# Patient Record
Sex: FEMALE | Race: BLACK OR AFRICAN AMERICAN | NOT HISPANIC OR LATINO | Employment: UNEMPLOYED | URBAN - METROPOLITAN AREA
[De-identification: names, ages, dates, MRNs, and addresses within clinical notes are randomized per-mention and may not be internally consistent; named-entity substitution may affect disease eponyms.]

---
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
## 2022-02-14 ENCOUNTER — HISTORICAL (OUTPATIENT)
Dept: ADMINISTRATIVE | Facility: HOSPITAL | Age: 22
End: 2022-02-14

## 2022-04-10 ENCOUNTER — HISTORICAL (OUTPATIENT)
Dept: ADMINISTRATIVE | Facility: HOSPITAL | Age: 22
End: 2022-04-10
Payer: MEDICAID

## 2022-04-29 VITALS
DIASTOLIC BLOOD PRESSURE: 61 MMHG | WEIGHT: 101 LBS | BODY MASS INDEX: 17.89 KG/M2 | OXYGEN SATURATION: 98 % | SYSTOLIC BLOOD PRESSURE: 90 MMHG | HEIGHT: 63 IN

## 2022-05-02 NOTE — HISTORICAL [facility] C[facility]
This is a historical note converted from [employer]. Formatting and pictures may have been removed.  Please reference [employer] for original formatting and attached multimedia. Indication for Surgery  17 y/o  with an incomplete  at 8-9wga  Preoperative Diagnosis  Incomplete   Postoperative Diagnosis  Incomplete   Operation  Suction D&C with ultrasound guidance  Surgeon(s)  Bryson Tay MD  ?  Staff  Linda Landa MD - Staff  Assistant  Betina Vale MD - PGY3  Anesthesia  General endotracheal  Estimated Blood Loss  30mL  Urine Output  30mL  ?  IVF  600mL  Findings  Exam under anesthesia revealed a 6 week size anteverted uterus, thickened endometrial stripe on ultrasound consistent with retained products of conception. Uterine contents were consistent with products of conception.  Specimen(s)  Products of conception  Complications  Non  Technique  The risks, benefits and alternatives of this procedure were discussed with the patient. The risks discussed include but are not limited to bleeding, infection, pain, need for further surgery, injury to viscera. The patient understood and all questions were answered. Informed consent was signed.  The patient was taken to the OR with IVF running where general anesthesia was administered and found to be adequate. The patient was placed in the dorsal lithotomy position with Yon stirrups and she was prepped and draped in the normal sterile fashion. Exam revealed the above findings. A weighted speculum was placed in the vagina and a right angle retractor was placed anteriorly to visualize the cervix. The anterior lip of the cervix was grasped with a single tooth tenaculum.  A number 8 suction curette was then advanced to the fundus under ultrasound guidance. This was withdrawn with a rotating manner and the products of conception removed. A thin endometrial stripe was noted.?Sharp curettage was performed in all quadrants until a gritty feeling was  noted throughout the uterus. A second pass was performed and minimal clot/products were noted. Hemostasis was noted.? The bladder was drained with a red rubber catheter. All instruments were removed.  The patient tolerated the procedure well.  Dr. Landa was present throughout.   EUA: cervix dilated to 2cm.? Blood at os.  Indication:? 17 yo F  with 8 wk gestation, presented to ED 2 days ago at [facility] with incomplete , report of clots at cervix/pain, sent home.? Had appointment with our outpatient clinic today and upon my exam had not completed passage of products.? Stat US showed retained POC, therefore surgical intervention was offered.? Medical management was discussed, however due to risk?of infection with >2 days of dilated cervix, surgical management was preferred.  Informed consent was obtained.  I was personally present throughout, and performed bedside US.? Curettings were not aggressive, however did show evacuation of all POC.? The patient had minimal EBL in surgery and CBC was checked prior to case which showed mild anemia, no leukocytosis.

## 2022-05-02 NOTE — HISTORICAL [facility] C[facility]
This is a historical note converted from [employer]. Formatting and pictures may have been removed.  Please reference [employer] for original formatting and attached multimedia. Chief Complaint  pre-op H&P - incomplete AB  History of Present Illness  19 y/o  with incomplete AB at approximately 8-9 wga. Was seen 19 at [facility] with complaints of ongoing vaginal bleeding. US notable for?intrauterine gestation at 8w0d with no cardiac activity.?Was discharged as?threatened AB, but has continued to bleed and pass tissue. Is having ongoing cramping/bleeding today. No other complaints  Review of Systems  Constitutional_denies weight changes; denies fever/chills; denies nausea/vomiting  Respiratory_denies difficulty breathing  Cardiovascular_denies chest pain  Gastrointestinal_denies abdominal pain; denies changes in bowel habits  Genitourinary_denies vaginal bleeding; denies vaginal discharge  Hema/Lymph_denies any bleeding  Musculoskeletal_denies muscle aches or pain  Neurologic_denies headaches  All Other ROS_negative?except HPI  Physical Exam  T:?36.9? ?C (Oral)? HR:?88(Peripheral)? RR:?20? BP:?93/61? SpO2:?99%? WT:?45.8?kg? BMI:?18.35?  ?   General_Well nourished, AAOX3, NAD  Neck_Normal range of motion, no obvious masses  Respiratory_Effort normal with no accessory muscle usage, lungs CTAB  Cardiovascular_RRR, S1/2 present with no added sounds  Gastrointestinal_Abdomen soft/non-tender, no organomegaly noted  Genitourinary_cervix 1-2cm dilated, with blood/products noted at os  Musculoskeletal_No swelling or edema of limbs  Neurologic_AAOx3, behavior normal, affect normal  Assessment/Plan  Orders:  Apply SCDs, 19 16:31:00 CDT, Stop date 19 16:31:00 CDT  MD to Nurse, 19 16:31:00 CDT, Umair surgical site appropriately  MD to Nurse, 19 16:31:00 CDT, Pre-Op per Anesthesia  Prep and Clip, 19 16:31:00 CDT, Clip/Prep for surgical case  Surgical Care Quality Measures, 19 16:31:00 CDT, Stop  date 19 16:31:00 CDT  Type and Crossmatch 1st order-auto, 19 16:26:00 CDT, Stat collect, Blood, Lab Collect, Packed RBC, Surgery, 2, 19, Order for future visit, 19 16:26:00 CDT  Vital Signs, 19 16:31:00 CDT, Once, Stop date 19 16:31:00 CDT, Routine  Void on Call, 19 16:31:00 CDT  17 y/o  with incomplete , for suction dilation and curettage  ?  -Counseling:?Alternatives to this planned procedure were explained to the patient including expectant, medical and other surgical management. This procedure and its risks, reasons, benefits and complications (including injury to bowel, bladder, major blood vessel, ureter, bleeding, possibility of transfusion, infection, scarring, dyspareunia, erosion, further surgery, failure of the procedure were reviewed in detail. Additional risks specific to this patient and procedure include injury to visceral and vascular structures. Patient counselled on risk of procedure not relieving her pelvic pain. Patient counseled on risk of blood transfusion including but not limited to allergic reaction, transmission of?HIV, Hep C 1:2million, transmission Hep B 1:250,000  -All questions answered, patient elects to proceed. All questions answered regarding blood transfusion and patient is OK to proceed if needed with this  -patient is 17 y/o and is accompanied by a family friend who she is currently living with  -To OR for suction D&C with ultrasound guidance   Problem List/Past Medical History  Ongoing  No qualifying data  Historical  Group B streptococcus  Pregnant  Pregnant  Procedure/Surgical History  Extraction of Products of Conception, Other, Via Natural or Artificial Opening (2019)  Repair Perineum Muscle, Open Approach (2019)  eye surgery   Medications  Inpatient  No active inpatient medications  Home  ferrous sulfate 325 mg (65 mg elemental iron) oral tablet, 325 mg= 1 tab(s), Oral, Daily  Keflex 500 mg oral capsule,  500 mg= 1 cap(s), Oral, q12hr  Allergies  No Known Medication Allergies  Social History  Abuse/Neglect  No, 2019  No, 2019  No, 2019  No, 2019  Alcohol  Never, 2016  Employment/School  Student, 2019  Home/Environment  Lives with Children, guardian. Living situation: Home/Independent., 2019  Nutrition/Health  Regular, 2019  Sexual  Sexually active: Yes., 2019  Spiritual/Cultural  None, 2019  Tobacco  Never (less than 100 in lifetime), N/A, 2019  Never smoker, 11/10/2016  Family History  Multiple sclerosis: Mother.  Lab Results  CBC and type and screen pending  Diagnostic Results  Radiology Report  US OB Transvaginal, US OB 1st Trimester  ?  REASON FOR STUDY: Incomplete , check for retained products of  conception  ?  LMP: 2019  ?  TECHNIQUE: Transabdominal and endovaginal ultrasound of the pelvis.?  ?  COMPARISON: Abdominal ultrasound dated 2019?  ?  FINDINGS:?  ?  The uterus measures 8.8 x 7.2 cm in size. There is no longer a  intrauterine gestation identified. The endometrium is 1.5 cm in  thickness. There are no vascular retained products of conception  identified.  ?  The right ovary measures 3.6 x 2.8 x 2.1 cm with a volume of 10.6 mL.  The left ovary measures 3.4 x 2.1 x 1.8 cm a volume of 7.2 mL. There  are no adnexal masses identified. There is no free fluid.  ?  ?  IMPRESSION:?  No evidence of retained products of conception.  ?  ?  Signature Line  Electronically Signed By: Promise Meraz MD  Date/Time Signed: 2019 15:51  ?      2 units type and crossed and CBC stat ordered   I personally saw her in the office and examined her.? I sent her for US and I see notable complex tissue within endometrium, 1.6cm heterogeneous.  Patient had 1.5cm dilated cervix, clot at cervix and small products that I removed with ring forceps.? Abd soft, NT;??Uterus anteverted,?8 weeks size, tender to palpation.  ?  ?Given 2 days of  incomplete AB, persistent bleeding, pain, I recommend D&C.? I have explained this to her, as well as option of cytotec - however I am concerned she is at increased risk of infection with further observation of this.? She agrees to proceed with D&C.? There is an older female with her, patient states this is who she lives with, but not relative.? She states her mother is in a nursing home and her father is not around.? Pt states father of baby is not around, not involved.  She has consented verbally to me.? Dr. Gonzalez discusssed and signed consents.? risks of bleeding, needing transfusion, uterine perforation and need for abdominal incision were discussed by me.? I explained we will order gonorrhea/chlamydia testing as well.?  ?

## 2022-05-02 NOTE — HISTORICAL [facility] C[facility]
This is a historical note converted from Derek. Formatting and pictures may have been removed.  Please reference Derek for original formatting and attached multimedia. Chief Complaint  post op D/C  History of Present Illness  19 y/o ?with hx incomplete  s/p suction D&C on 2019. Procedure overall uncomplicated.?Minimal cramping since procedure.?Denies vagina?bleeding. Presents today with her guardian. Desires contraception but unsure of?method. Denies sexual acitivity since procedure. Remains complaint?with valcyclovir and cephalexin. Feels safe at home .States her oldest child is doing well. Remains in school.  ?   Intraop Findings: Exam under anesthesia revealed a 6 week size anteverted uterus, thickened endometrial stripe on ultrasound consistent with retained products of conception. Uterine contents were consistent with products of conception. [1] ?  ?  Uterine contents:  - Decidua, hypersecretory endometrium and blood clot. No chorionic villi are identified.  ?Note: See case [id number].  ?  Review of Systems  Constitutional - denies fever, chills, unintended?weight loss  Cardiovascular - denies chest pain, palpitations  Respiratory - denies shortness of breath, wheezing  Gastrointestinal - denies abdominal pain, acid reflux, nausea, vomiting, diarrhea, constipation, blood in the stool  Genitourinary - denies vaginal discharge, vulvovaginal itching, dyspareunia, pelvic pain, dysuria, hematuria  Musculoskeletal - denies back pain  Neurologic - denies numbness or tingling in her extremities  Physical Exam  Vitals & Measurements  T:?36.6? ?C (Oral)? HR:?68(Peripheral)? RR:?18? BP:?102/66? SpO2:?98%?  HT:?156?cm? WT:?46.6?kg? BMI:?19.15?  General Appearance: Alert, cooperative, no distress, well appearring  Lungs: Clear to auscultation bilaterally, respirations unlabored  Heart: Regular rate and rhythm, S1 and S2 normal, no murmur, rub or gallop  Abdomen: Soft, thin, non-tender, bowel sounds  active all four quadrants, no masses, no organomegaly  Genitalia:  - External genitalia: Normal without lesions  - Urethral meatus: Normal  - Bladder: No suprapubic tenderness  - Vaginal/pelvic support: Normal, moist vaginal mucosa without lesions. No blood with diffuse thick white clumps of vaginal discharge c/w candidiasis  - Cervix: No CMT, no lesions  - Uterus: small ,mobile, non-tender  - Adnexa/parametria: No fullness or masses  - Anus/perineum: Intact without lesions or hemorrhoids  Extremities: Extremities normal, atraumatic, no cyanosis or edema  Skin: Skin turgor normal, no rashes or lesions.?  ?  Assessment/Plan  1.?Incomplete ?O03.4  Pathology reviewed.  Ibuprofen prn cramping.  Contraception Counseling: Patient counseled on all risks/benefits/alternatives of contraceptive options including LARCS/IUD, Implant, Injections, Combined Oral Contraceptive Pills, Progesterone only pills, barrier methods, vasectomy, natural family planning and abstinence.  Wet prep collected.  Diflucan sent to patients pharmacy. #1 refills  Ordered:  medroxyPROGESTERone, 150 mg, form: Injection, IM, Once, first dose 19 9:00:00 CST, stop date 19 9:00:00 CST, Routine, Months +3, Future Order  medroxyPROGESTERone, 150 mg, form: Injection, IM, Once, first dose 19 9:00:00 CDT, stop date 19 9:00:00 CDT, Routine, Months 0  medroxyPROGESTERone, 150 mg, form: Injection, IM, Once, first dose 20 9:00:00 CDT, stop date 20 9:00:00 CDT, Routine, Months +9, Future Order  medroxyPROGESTERone, 150 mg, form: Injection, IM, Once, first dose 20 9:00:00 CDT, stop date 20 9:00:00 CDT, Routine, Months +6, Future Order  CBC w/ Auto Diff, Routine collect, 19 8:50:00 CDT, Blood, Order for future visit, Stop date 19 8:50:00 CDT, Lab Collect, Incomplete , 19 8:50:00 CDT  Clinic Follow up, *Est. 10/13/19 3:00:00 CDT, Order for future visit, Incomplete , [facility]  ?  Orders:  Wet Prep Smear, Stat collect, Vaginal, Order for future visit, 19 8:50:00 CDT, Stop date 19 8:50:00 CDT, Nurse collect, Vaginal discharge, 19 8:50:00 CDT   OB History  Pregnancy History???(1,0,0,1)?? ??  Pregnancy # 1  Baby 1?????????????Outcome Date:?2019????? Outcome:?Live Birth  ???Outcome or Result:?Vaginal, Forcep Assist  ???Gender:?Male????????Gest Age:?38 weeks 3 days ??????Wt:??3210 g  ???Hospital:?[facility]????????Virgilio Labor:?--  ???Marla Name:?--?????Babys Father:?--  ???Maternal Complications:?Group B streptococcus  ??? Complications:?Facial bruising  ?  Pregnancy # 2  Baby 1?????????????Outcome Date:?2019????? Outcome:?Fetal Death  ???Outcome or Result:?Vaginal  ???Gender:?--????????Gest Age:?8 weeks ??????Wt:?--  ???Hospital:?--????????Virgilio Labor:?--  ???Marla Name:?--?????Babys Father:?--  Problem List/Past Medical History  Ongoing  No qualifying data  Historical  Group B streptococcus  Pregnant  Pregnant  Procedure/Surgical History  Dilation & Curettage (2019)  Extraction of Products of Conception, Retained, Via Natural or Artificial Opening (2019)  Treatment of incomplete , any trimester, completed surgically (2019)  Extraction of Products of Conception, Other, Via Natural or Artificial Opening (2019)  Repair Perineum Muscle, Open Approach (2019)  eye surgery   Medications  acetaminophen-hydrocodone 325 mg-5 mg oral tablet, 1 tab(s), Oral, QID,? ?Not taking  azithromycin 500 mg oral tablet, 1000 mg= 2 tab(s), Oral, Once,? ?Not Taking, Completed Rx  cephalexin 500 mg oral capsule, 500 mg= 1 cap(s), Oral, BID  ferrous sulfate 325 mg (65 mg elemental iron) oral tablet, 325 mg= 1 tab(s), Oral, Daily,? ?Not taking  ibuprofen 600 mg oral tablet, 600 mg= 1 tab(s), Oral, q8hr,? ?Not taking  metroNIDAZOLE 250 mg oral tablet, 500 mg= 2 tab(s), Oral, BID,? ?Investigating  metronidazole 500 mg oral  tablet, 500 mg= 1 tab(s), Oral, BID,? ?Investigating  nitrofurantoin macrocrystals-monohydrate 100 mg oral capsule, 100 mg= 1 cap(s), Oral, BID,? ?Not Taking, Completed Rx  Toradol 10 mg oral tablet, 10 mg= 1 tab(s), Oral, QID,? ?Not taking  valacyclovir 500 mg oral tablet, 500 mg= 1 tab(s), Oral, Daily  Allergies  No Known Medication Allergies      Reviewed the patients medical history, residents findings on physical exam, and the diagnosis with treatment plan. Care provided was reasonable and necessary.

## 2022-06-01 VITALS
HEART RATE: 73 BPM | OXYGEN SATURATION: 99 % | TEMPERATURE: 98 F | HEIGHT: 62 IN | DIASTOLIC BLOOD PRESSURE: 79 MMHG | RESPIRATION RATE: 18 BRPM | BODY MASS INDEX: 17.85 KG/M2 | SYSTOLIC BLOOD PRESSURE: 117 MMHG | WEIGHT: 97 LBS

## 2022-06-01 PROCEDURE — 99900041 HC LEFT WITHOUT BEING SEEN- EMERGENCY

## 2022-06-02 ENCOUNTER — HOSPITAL ENCOUNTER (EMERGENCY)
Facility: HOSPITAL | Age: 22
Discharge: LEFT BEFORE DISCHARGE | End: 2022-06-02
Attending: INTERNAL MEDICINE
Payer: MEDICAID

## 2022-06-02 LAB
AMPHET UR QL SCN: NEGATIVE
APPEARANCE UR: CLEAR
B-HCG SERPL QL: POSITIVE
BACTERIA #/AREA URNS AUTO: ABNORMAL /HPF
BARBITURATE SCN PRESENT UR: NEGATIVE
BENZODIAZ UR QL SCN: NEGATIVE
BILIRUB UR QL STRIP.AUTO: NEGATIVE MG/DL
CANNABINOIDS UR QL SCN: POSITIVE
COCAINE UR QL SCN: NEGATIVE
COLOR UR AUTO: ABNORMAL
FENTANYL UR QL SCN: NEGATIVE
GLUCOSE UR QL STRIP.AUTO: NEGATIVE MG/DL
KETONES UR QL STRIP.AUTO: NEGATIVE MG/DL
LEUKOCYTE ESTERASE UR QL STRIP.AUTO: NEGATIVE UNIT/L
MDMA UR QL SCN: NEGATIVE
MUCOUS THREADS URNS QL MICRO: ABNORMAL /LPF
NITRITE UR QL STRIP.AUTO: NEGATIVE
OPIATES UR QL SCN: NEGATIVE
PCP UR QL: NEGATIVE
PH UR STRIP.AUTO: 6 [PH]
PH UR: 6 [PH] (ref 3–11)
PROT UR QL STRIP.AUTO: 30 MG/DL
RBC #/AREA URNS AUTO: ABNORMAL /HPF
RBC UR QL AUTO: NEGATIVE UNIT/L
SP GR UR STRIP.AUTO: >=1.03
SPECIFIC GRAVITY, URINE AUTO (.000) (OHS): >=1.03 (ref 1–1.03)
SQUAMOUS #/AREA URNS AUTO: ABNORMAL /LPF
UROBILINOGEN UR STRIP-ACNC: 1 MG/DL
WBC #/AREA URNS AUTO: ABNORMAL /HPF

## 2022-06-02 PROCEDURE — 81001 URINALYSIS AUTO W/SCOPE: CPT | Mod: 59 | Performed by: INTERNAL MEDICINE

## 2022-06-02 PROCEDURE — 81025 URINE PREGNANCY TEST: CPT | Performed by: INTERNAL MEDICINE

## 2022-06-02 PROCEDURE — 80307 DRUG TEST PRSMV CHEM ANLYZR: CPT | Performed by: INTERNAL MEDICINE

## 2022-06-02 NOTE — ED NOTES
"Assumed care from zuri. Pt arrives to ED ambulatory with reports of abdominal pain in pregnancy. Pt states she is 11 weeks gestation, MARIA EUGENIA 22, . Pt states she does not see and OB until next week. Pt reports bilateral lower abdominal cramping and "tightness" on left lower abdomen. NAD is noted at time of assessment. Will continue to monitor.   "

## 2022-06-02 NOTE — ED NOTES
Patient walked out of room 5 and stated she was leaving. Verbalized risks with pt and she stated she was leaving and walked out. This information verbalized to Dr. Amor.

## 2022-06-12 ENCOUNTER — HOSPITAL ENCOUNTER (EMERGENCY)
Facility: HOSPITAL | Age: 22
Discharge: HOME | End: 2022-06-12
Attending: FAMILY MEDICINE
Payer: MEDICAID

## 2022-06-12 VITALS
BODY MASS INDEX: 18.03 KG/M2 | WEIGHT: 98 LBS | RESPIRATION RATE: 16 BRPM | SYSTOLIC BLOOD PRESSURE: 102 MMHG | DIASTOLIC BLOOD PRESSURE: 69 MMHG | TEMPERATURE: 98 F | HEIGHT: 62 IN | HEART RATE: 72 BPM | OXYGEN SATURATION: 100 %

## 2022-06-12 DIAGNOSIS — E86.0 DEHYDRATION: ICD-10-CM

## 2022-06-12 DIAGNOSIS — R42 DIZZINESS: Primary | ICD-10-CM

## 2022-06-12 LAB
ABS NEUT CALC (OHS): 1.01 X10(3)/MCL (ref 2.1–9.2)
ALBUMIN SERPL-MCNC: 3.9 GM/DL (ref 3.5–5)
ALBUMIN/GLOB SERPL: 1.2 RATIO (ref 1.1–2)
ALP SERPL-CCNC: 59 UNIT/L (ref 40–150)
ALT SERPL-CCNC: <5 UNIT/L (ref 0–55)
APPEARANCE UR: CLEAR
AST SERPL-CCNC: 16 UNIT/L (ref 5–34)
B-HCG SERPL QL: POSITIVE
BILIRUB UR QL STRIP.AUTO: NEGATIVE MG/DL
BILIRUBIN DIRECT+TOT PNL SERPL-MCNC: 1 MG/DL
BUN SERPL-MCNC: 5 MG/DL (ref 7–18.7)
CALCIUM SERPL-MCNC: 9.7 MG/DL (ref 8.4–10.2)
CHLORIDE SERPL-SCNC: 104 MMOL/L (ref 98–107)
CO2 SERPL-SCNC: 24 MMOL/L (ref 22–29)
COLOR UR AUTO: YELLOW
CREAT SERPL-MCNC: 0.71 MG/DL (ref 0.55–1.02)
EOSINOPHIL NFR BLD MANUAL: 0.04 X10(3)/MCL (ref 0–0.9)
EOSINOPHIL NFR BLD MANUAL: 1 % (ref 0–8)
ERYTHROCYTE [DISTWIDTH] IN BLOOD BY AUTOMATED COUNT: 15.4 % (ref 11.5–17)
GLOBULIN SER-MCNC: 3.3 GM/DL (ref 2.4–3.5)
GLUCOSE SERPL-MCNC: 78 MG/DL (ref 74–100)
GLUCOSE UR QL STRIP.AUTO: NEGATIVE MG/DL
HCT VFR BLD AUTO: 40.4 % (ref 37–47)
HGB BLD-MCNC: 12.4 GM/DL (ref 12–16)
IMM GRANULOCYTES # BLD AUTO: 0 X10(3)/MCL (ref 0–0.02)
IMM GRANULOCYTES NFR BLD AUTO: 0 % (ref 0–0.43)
KETONES UR QL STRIP.AUTO: NEGATIVE MG/DL
LEUKOCYTE ESTERASE UR QL STRIP.AUTO: NEGATIVE UNIT/L
LYMPHOCYTES NFR BLD MANUAL: 2.21 X10(3)/MCL
LYMPHOCYTES NFR BLD MANUAL: 63 % (ref 13–40)
MCH RBC QN AUTO: 24.3 PG (ref 27–31)
MCHC RBC AUTO-ENTMCNC: 30.7 MG/DL (ref 33–36)
MCV RBC AUTO: 79.2 FL (ref 80–94)
MONOCYTES NFR BLD MANUAL: 0.24 X10(3)/MCL (ref 0.1–1.3)
MONOCYTES NFR BLD MANUAL: 7 % (ref 2–11)
NEUTROPHILS NFR BLD MANUAL: 29 % (ref 47–80)
NITRITE UR QL STRIP.AUTO: NEGATIVE
PH UR STRIP.AUTO: 7.5 [PH]
PLATELET # BLD AUTO: 273 X10(3)/MCL (ref 130–400)
PLATELET # BLD EST: ADEQUATE 10*3/UL
PMV BLD AUTO: 9.8 FL (ref 9.4–12.4)
POTASSIUM SERPL-SCNC: 4 MMOL/L (ref 3.5–5.1)
PROT SERPL-MCNC: 7.2 GM/DL (ref 6.4–8.3)
PROT UR QL STRIP.AUTO: NEGATIVE MG/DL
RBC # BLD AUTO: 5.1 X10(6)/MCL (ref 4.2–5.4)
RBC MORPH BLD: NORMAL
RBC UR QL AUTO: NEGATIVE UNIT/L
SODIUM SERPL-SCNC: 137 MMOL/L (ref 136–145)
SP GR UR STRIP.AUTO: 1.02
UROBILINOGEN UR STRIP-ACNC: 1 MG/DL
WBC # SPEC AUTO: 3.5 X10(3)/MCL (ref 4.5–11.5)

## 2022-06-12 PROCEDURE — 96360 HYDRATION IV INFUSION INIT: CPT

## 2022-06-12 PROCEDURE — 99284 EMERGENCY DEPT VISIT MOD MDM: CPT | Mod: 25

## 2022-06-12 PROCEDURE — 81025 URINE PREGNANCY TEST: CPT | Performed by: FAMILY MEDICINE

## 2022-06-12 PROCEDURE — 36415 COLL VENOUS BLD VENIPUNCTURE: CPT | Performed by: FAMILY MEDICINE

## 2022-06-12 PROCEDURE — 63600175 PHARM REV CODE 636 W HCPCS: Performed by: FAMILY MEDICINE

## 2022-06-12 PROCEDURE — 81003 URINALYSIS AUTO W/O SCOPE: CPT | Performed by: FAMILY MEDICINE

## 2022-06-12 PROCEDURE — 85025 COMPLETE CBC W/AUTO DIFF WBC: CPT | Performed by: FAMILY MEDICINE

## 2022-06-12 PROCEDURE — 80053 COMPREHEN METABOLIC PANEL: CPT | Performed by: FAMILY MEDICINE

## 2022-06-12 RX ADMIN — SODIUM CHLORIDE, POTASSIUM CHLORIDE, SODIUM LACTATE AND CALCIUM CHLORIDE 1000 ML: 600; 310; 30; 20 INJECTION, SOLUTION INTRAVENOUS at 12:06

## 2022-06-12 NOTE — ED PROVIDER NOTES
Encounter Date: 2022       History     Chief Complaint   Patient presents with    Dizziness     Pt states is 12 weeks and 4 days pregnant, reports dizziness and weakness for the last few days.      20 y/o female presents to the ER with complaints of weakness and lightheadedness, worse when going from sitting to standing.  Patient is a  with IUP of 12wga.  OB appointment this week.  No fever or chills.  No nausea or vomiting.  No dysuria or hematuria.        Review of patient's allergies indicates:  No Known Allergies  History reviewed. No pertinent past medical history.  Past Surgical History:   Procedure Laterality Date    DILATION AND CURETTAGE OF UTERUS      EYE SURGERY Right      No family history on file.  Social History     Tobacco Use    Smoking status: Never Smoker    Smokeless tobacco: Never Used   Substance Use Topics    Alcohol use: Never    Drug use: Never     Review of Systems   Constitutional: Negative for chills, fatigue and fever.   HENT: Negative for ear pain, rhinorrhea and sore throat.    Eyes: Negative for photophobia and pain.   Respiratory: Negative for cough, shortness of breath and wheezing.    Cardiovascular: Negative for chest pain.   Gastrointestinal: Negative for abdominal pain, diarrhea, nausea and vomiting.   Genitourinary: Negative for dysuria.   Neurological: Positive for dizziness and weakness. Negative for headaches.        Presyncope   All other systems reviewed and are negative.      Physical Exam     Initial Vitals [22 1114]   BP Pulse Resp Temp SpO2   (!) 132/90 65 16 97.9 °F (36.6 °C) 100 %      MAP       --         Physical Exam    Nursing note and vitals reviewed.  Constitutional: She appears well-developed and well-nourished. No distress.   HENT:   Head: Normocephalic and atraumatic.   Eyes: Conjunctivae and EOM are normal. Pupils are equal, round, and reactive to light.   Neck: Neck supple.   Normal range of motion.  Cardiovascular: Normal rate,  regular rhythm and normal heart sounds.   Pulmonary/Chest: No respiratory distress. She has no wheezes. She has no rhonchi. She has no rales.   Abdominal: Abdomen is soft. Bowel sounds are normal. She exhibits no distension. There is no abdominal tenderness. There is no rebound and no guarding.   Musculoskeletal:         General: Normal range of motion.      Cervical back: Normal range of motion and neck supple.     Neurological: She is alert and oriented to person, place, and time.   Skin: Skin is warm and dry. Capillary refill takes less than 2 seconds. No erythema.   Psychiatric: She has a normal mood and affect. Her behavior is normal. Judgment and thought content normal.         ED Course   Procedures  Labs Reviewed   HCG QUALITATIVE URINE - Abnormal; Notable for the following components:       Result Value    Beta hCG Qualitative, Urine Positive (*)     All other components within normal limits   COMPREHENSIVE METABOLIC PANEL - Abnormal; Notable for the following components:    Blood Urea Nitrogen 5.0 (*)     All other components within normal limits   CBC WITH DIFFERENTIAL - Abnormal; Notable for the following components:    WBC 3.5 (*)     MCV 79.2 (*)     MCH 24.3 (*)     MCHC 30.7 (*)     All other components within normal limits   MANUAL DIFFERENTIAL - Abnormal; Notable for the following components:    Neut Man 29 (*)     Lymph Man 63 (*)     Abs Neut calc 1.015 (*)     Abs Lymp 2.205 (*)     All other components within normal limits   URINALYSIS   CBC W/ AUTO DIFFERENTIAL    Narrative:     The following orders were created for panel order CBC Auto Differential.  Procedure                               Abnormality         Status                     ---------                               -----------         ------                     CBC with Differential[[id number]]        Abnormal            Final result               Manual Differential[[id number]]          Abnormal            Final result                  Please view results for these tests on the individual orders.          Imaging Results    None          Medications   lactated ringers bolus 1,000 mL (1,000 mLs Intravenous New Bag 6/12/22 1243)                 ED Course as of 06/12/22 1319   Sun Jun 12, 2022   1315 Feeling much improved;  Reassurance given to patient and family.  ER precautions for any acute worsening. [MW]      ED Course User Index  [MW] Edgar Leonard MD             Clinical Impression:   Final diagnoses:  [R42] Dizziness (Primary)  [E86.0] Dehydration          ED Disposition Condition    Discharge Stable        ED Prescriptions     None        Follow-up Information     Follow up With Specialties Details Why Contact Info    [facility] - Emergency Dept Emergency Medicine  As needed, If symptoms worsen [address] [facility] [id number] [phone]    Followup with DANIELLE Leonard MD  06/12/22 1319       Edgar Leonard MD  07/06/22 2000

## 2022-06-14 LAB
CHLAMYDIA: NEGATIVE
GONOCOCCUS BY NAA: NEGATIVE
MYCOPLASMA GENITALIUM, PCR: NEGATIVE
TRICHOMONAS: NEGATIVE
URINE CULTURE, ROUTINE: NO GROWTH

## 2022-06-23 ENCOUNTER — HISTORICAL (OUTPATIENT)
Dept: ADMINISTRATIVE | Facility: HOSPITAL | Age: 22
End: 2022-06-23

## 2022-07-06 LAB
HGB ELECTROPHORESIS: NORMAL
INDIRECT COOMBS: NEGATIVE
RUBELLA IMMUNE STATUS: NORMAL

## 2022-08-02 LAB
AMPHETAMINES: NEGATIVE
BARBITURATES: NEGATIVE
BENZODIAZEPINES: NEGATIVE
CANNABINOID: POSITIVE
CHLAMYDIA: NEGATIVE
COCAINE: NEGATIVE
GONOCOCCUS BY NAA: NEGATIVE
METHAMPHETAMINE: NEGATIVE
OPIATES: NEGATIVE
PCP: NEGATIVE

## 2022-09-15 ENCOUNTER — HISTORICAL (OUTPATIENT)
Dept: ADMINISTRATIVE | Facility: HOSPITAL | Age: 22
End: 2022-09-15
Payer: MEDICAID

## 2022-09-16 ENCOUNTER — HISTORICAL (OUTPATIENT)
Dept: ADMINISTRATIVE | Facility: HOSPITAL | Age: 22
End: 2022-09-16
Payer: MEDICAID

## 2022-09-19 ENCOUNTER — HOSPITAL ENCOUNTER (EMERGENCY)
Facility: HOSPITAL | Age: 22
Discharge: HOME | End: 2022-09-19
Payer: MEDICAID

## 2022-09-19 VITALS — BODY MASS INDEX: 20.8 KG/M2 | WEIGHT: 113 LBS | HEIGHT: 62 IN

## 2022-09-19 PROBLEM — O26.899 ABDOMINAL CRAMPING AFFECTING PREGNANCY: Status: ACTIVE | Noted: 2022-09-19

## 2022-09-19 PROBLEM — R10.9 ABDOMINAL CRAMPING AFFECTING PREGNANCY: Status: ACTIVE | Noted: 2022-09-19

## 2022-09-19 LAB
APPEARANCE UR: CLEAR
BACTERIA #/AREA URNS AUTO: ABNORMAL /HPF
BILIRUB UR QL STRIP.AUTO: NEGATIVE MG/DL
COLOR UR AUTO: YELLOW
GLUCOSE UR QL STRIP.AUTO: NEGATIVE MG/DL
KETONES UR QL STRIP.AUTO: NEGATIVE MG/DL
LEUKOCYTE ESTERASE UR QL STRIP.AUTO: ABNORMAL UNIT/L
NITRITE UR QL STRIP.AUTO: NEGATIVE
PH UR STRIP.AUTO: 7.5 [PH]
PROT UR QL STRIP.AUTO: NEGATIVE MG/DL
RBC #/AREA URNS AUTO: <5 /HPF
RBC UR QL AUTO: NEGATIVE UNIT/L
SP GR UR STRIP.AUTO: 1.01 (ref 1–1.03)
SQUAMOUS #/AREA URNS AUTO: 6 /HPF
UROBILINOGEN UR STRIP-ACNC: 1 MG/DL
WBC #/AREA URNS AUTO: 9 /HPF

## 2022-09-19 PROCEDURE — 81001 URINALYSIS AUTO W/SCOPE: CPT | Performed by: OBSTETRICS & GYNECOLOGY

## 2022-09-19 PROCEDURE — 99284 EMERGENCY DEPT VISIT MOD MDM: CPT | Mod: 25

## 2022-09-19 NOTE — ED PROVIDER NOTES
"       ALDA NOTE  [facility]     Admit Date: 2022  ALDA Physician: Cleopatra Hill  Primary OBGYN: No Attending/OB Hospitalist Group Admit    Admit Diagnosis/Chief Complaint:  cramping  Discharge Diagnosis:  dehydration 2nd trimester pregnancy    Chief Complaint   Patient presents with    Abdominal Pain     IUP, pt unsure of gestational ago states she has not been to the MD in 2 months, previously was being followed by Dr. Vasquez in [facility]:  Meena Hurtado is a 21 y.o.  at 20w4d by [place] ED ultrasound, presents complaining of cramping.  This IUP is complicated by no prenatal care this pregnancy since she moved from [place] to [place]. Reports increased vaginal discharge.    Patient denies vaginal bleeding, leakage of fluid, contractions, headache, vision changes, RUQ pain, dysuria, fever, and nausea/vomiting.  Fetal Movement: normal.    Ht 5' 2" (1.575 m)   Wt 51.3 kg (113 lb)   LMP 03/15/2022 (Approximate) Comment: MARIA EUGENIA based on 1st trimester US from Dr. Vasquez per pt  Breastfeeding No   BMI 20.67 kg/m²        General: in no apparent distress well developed and well nourished non-toxic in no respiratory distress and acyanotic alert oriented times 3 afebrile normal vitals  Cardiovascular: regular rate and rhythm no murmurs  Respiratory: unlabored  Abdominal: soft, nontender, nondistended, no abnormal masses, no epigastric pain FHT present  FH 21cm  Back: lumbar tenderness absent CVA tenderness none suprapubic tenderness absent  Extremeties no redness or tenderness in the calves or thighs no edema    SVE (PeriWATCH)  Dilation (cm): 0  Effacement (%): 0  Station: -4  Cervical Position: Mid Position  Cervical Consistency: Firm  Examined by:: Sergio Serrano in room: [facility]  Vaginal Exam Comments: normal leukorrhea on glove  Hsu Score: 1  Simplified Hsu Score: 0         EFM: FHT present 150s      Medical Decision Making:      LABS:   No " results found for this or any previous visit (from the past 24 hour(s)).    Imaging Results    None          ASSESMENT: Meena Hurtado is a 21 y.o.   at 20w4d with     Discharge Diagnosis:   Patient Active Problem List   Diagnosis    Abdominal cramping affecting pregnancy       Status:Improved    Disposition:  discharged to home    UA sent, will call with results    Patient Instructions:   - Pt was given routine pregnancy instructions including to return to triage if she had any vaginal bleeding (other than spotting for the next 48hrs), any loss of fluid like her water broke, decreased fetal movement, or contractions Q 5min lasting for 2 or more hours. Pt was also instructed to drink copious water. Patient voiced understanding of all these instructions and was subsequently discharged home. Tylenol use and maternity belt use discussed. All questions answered. Pt left ALDA with good understanding of plan.   Preeclampsia/ROM/labor/fever/decreased FM with C precautions discussed, voiced understanding     Phone number and info given for [facility] clinic to establish care SHAW Hill MD  OB/GYN Hospitalist  5:59 PM 2022

## 2022-09-19 NOTE — DISCHARGE INSTRUCTIONS
Keep Regular scheduled appointments  Return to hospital for vaginal bleeding, leaking of fluids, regular contractions, decreased fetal movement.

## 2022-09-29 ENCOUNTER — OFFICE VISIT (OUTPATIENT)
Dept: FAMILY MEDICINE | Facility: CLINIC | Age: 22
End: 2022-09-29
Payer: MEDICAID

## 2022-09-29 ENCOUNTER — HOSPITAL ENCOUNTER (OUTPATIENT)
Dept: RADIOLOGY | Facility: HOSPITAL | Age: 22
Discharge: HOME | End: 2022-09-29
Attending: OBSTETRICS & GYNECOLOGY
Payer: MEDICAID

## 2022-09-29 VITALS
HEART RATE: 92 BPM | SYSTOLIC BLOOD PRESSURE: 92 MMHG | HEIGHT: 62 IN | OXYGEN SATURATION: 100 % | WEIGHT: 113.38 LBS | RESPIRATION RATE: 18 BRPM | DIASTOLIC BLOOD PRESSURE: 56 MMHG | BODY MASS INDEX: 20.86 KG/M2 | TEMPERATURE: 98 F

## 2022-09-29 DIAGNOSIS — K21.9 GASTROESOPHAGEAL REFLUX DISEASE, UNSPECIFIED WHETHER ESOPHAGITIS PRESENT: ICD-10-CM

## 2022-09-29 DIAGNOSIS — O09.30 LATE PRENATAL CARE: Primary | ICD-10-CM

## 2022-09-29 DIAGNOSIS — Z13.31 POSITIVE DEPRESSION SCREENING: ICD-10-CM

## 2022-09-29 DIAGNOSIS — Z3A.22 22 WEEKS GESTATION OF PREGNANCY: ICD-10-CM

## 2022-09-29 DIAGNOSIS — Z34.90 PREGNANCY: ICD-10-CM

## 2022-09-29 LAB
ANION GAP SERPL CALC-SCNC: 8 MEQ/L
APPEARANCE UR: CLEAR
BACTERIA #/AREA URNS AUTO: ABNORMAL /HPF
BASOPHILS # BLD AUTO: 0.04 X10(3)/MCL (ref 0–0.2)
BASOPHILS NFR BLD AUTO: 0.9 %
BILIRUB SERPL-MCNC: NEGATIVE MG/DL
BILIRUB UR QL STRIP.AUTO: NEGATIVE MG/DL
BLOOD URINE, POC: NEGATIVE
BUN SERPL-MCNC: 6 MG/DL (ref 7–18.7)
CALCIUM SERPL-MCNC: 8.7 MG/DL (ref 8.4–10.2)
CHLORIDE SERPL-SCNC: 106 MMOL/L (ref 98–107)
CLARITY, POC UA: CLEAR
CO2 SERPL-SCNC: 25 MMOL/L (ref 22–29)
COLOR UR AUTO: ABNORMAL
COLOR, POC UA: YELLOW
CREAT SERPL-MCNC: 0.62 MG/DL (ref 0.55–1.02)
CREAT/UREA NIT SERPL: 10
EOSINOPHIL # BLD AUTO: 0.21 X10(3)/MCL (ref 0–0.9)
EOSINOPHIL NFR BLD AUTO: 4.5 %
ERYTHROCYTE [DISTWIDTH] IN BLOOD BY AUTOMATED COUNT: 13.6 % (ref 11.5–17)
GFR SERPLBLD CREATININE-BSD FMLA CKD-EPI: >60 MLS/MIN/1.73/M2
GLUCOSE SERPL-MCNC: 65 MG/DL (ref 74–100)
GLUCOSE UR QL STRIP.AUTO: NORMAL MG/DL
GLUCOSE UR QL STRIP: NEGATIVE
HBV SURFACE AG SERPL QL IA: NONREACTIVE
HCT VFR BLD AUTO: 34.1 % (ref 37–47)
HCV AB SERPL QL IA: NONREACTIVE
HGB BLD-MCNC: 10.6 GM/DL (ref 12–16)
HIV 1+2 AB+HIV1 P24 AG SERPL QL IA: NONREACTIVE
HYALINE CASTS #/AREA URNS LPF: ABNORMAL /LPF
IMM GRANULOCYTES # BLD AUTO: 0.02 X10(3)/MCL (ref 0–0.04)
IMM GRANULOCYTES NFR BLD AUTO: 0.4 %
INDIRECT COOMBS GEL: NORMAL
KETONES UR QL STRIP.AUTO: NEGATIVE MG/DL
KETONES UR QL STRIP: NEGATIVE
LEUKOCYTE ESTERASE UR QL STRIP.AUTO: NEGATIVE UNIT/L
LEUKOCYTE ESTERASE URINE, POC: NORMAL
LYMPHOCYTES # BLD AUTO: 1.56 X10(3)/MCL (ref 0.6–4.6)
LYMPHOCYTES NFR BLD AUTO: 33.3 %
MCH RBC QN AUTO: 24.9 PG (ref 27–31)
MCHC RBC AUTO-ENTMCNC: 31.1 MG/DL (ref 33–36)
MCV RBC AUTO: 80.2 FL (ref 80–94)
MONOCYTES # BLD AUTO: 0.59 X10(3)/MCL (ref 0.1–1.3)
MONOCYTES NFR BLD AUTO: 12.6 %
MUCOUS THREADS URNS QL MICRO: ABNORMAL /LPF
NEUTROPHILS # BLD AUTO: 2.3 X10(3)/MCL (ref 2.1–9.2)
NEUTROPHILS NFR BLD AUTO: 48.3 %
NITRITE UR QL STRIP.AUTO: NEGATIVE
NITRITE, POC UA: NEGATIVE
NRBC BLD AUTO-RTO: 0 %
PH UR STRIP.AUTO: 6.5 [PH]
PH, POC UA: 7
PLATELET # BLD AUTO: 233 X10(3)/MCL (ref 130–400)
PMV BLD AUTO: 10.5 FL (ref 7.4–10.4)
POTASSIUM SERPL-SCNC: 3.6 MMOL/L (ref 3.5–5.1)
PROT UR QL STRIP.AUTO: NEGATIVE MG/DL
PROTEIN, POC: NEGATIVE
RBC # BLD AUTO: 4.25 X10(6)/MCL (ref 4.2–5.4)
RBC #/AREA URNS AUTO: ABNORMAL /HPF
RBC UR QL AUTO: NEGATIVE UNIT/L
SODIUM SERPL-SCNC: 139 MMOL/L (ref 136–145)
SP GR UR STRIP.AUTO: 1.01
SPECIFIC GRAVITY, POC UA: 1.01
SQUAMOUS #/AREA URNS LPF: ABNORMAL /HPF
T PALLIDUM AB SER QL: NONREACTIVE
UROBILINOGEN UR STRIP-ACNC: NORMAL MG/DL
UROBILINOGEN, POC UA: NORMAL
WBC # SPEC AUTO: 4.7 X10(3)/MCL (ref 4.5–11.5)
WBC #/AREA URNS AUTO: ABNORMAL /HPF

## 2022-09-29 PROCEDURE — 87625 HPV TYPES 16 & 18 ONLY: CPT

## 2022-09-29 PROCEDURE — 36415 COLL VENOUS BLD VENIPUNCTURE: CPT

## 2022-09-29 PROCEDURE — 87491 CHLMYD TRACH DNA AMP PROBE: CPT

## 2022-09-29 PROCEDURE — 80048 BASIC METABOLIC PNL TOTAL CA: CPT

## 2022-09-29 PROCEDURE — 86780 TREPONEMA PALLIDUM: CPT

## 2022-09-29 PROCEDURE — 81002 URINALYSIS NONAUTO W/O SCOPE: CPT | Mod: PBBFAC

## 2022-09-29 PROCEDURE — 76805 OB US >/= 14 WKS SNGL FETUS: CPT | Mod: TC

## 2022-09-29 PROCEDURE — 87252 VIRUS INOCULATION TISSUE: CPT

## 2022-09-29 PROCEDURE — 87389 HIV-1 AG W/HIV-1&-2 AB AG IA: CPT

## 2022-09-29 PROCEDURE — 87088 URINE BACTERIA CULTURE: CPT

## 2022-09-29 PROCEDURE — 86787 VARICELLA-ZOSTER ANTIBODY: CPT

## 2022-09-29 PROCEDURE — 86803 HEPATITIS C AB TEST: CPT

## 2022-09-29 PROCEDURE — 86850 RBC ANTIBODY SCREEN: CPT | Performed by: STUDENT IN AN ORGANIZED HEALTH CARE EDUCATION/TRAINING PROGRAM

## 2022-09-29 PROCEDURE — 99214 OFFICE O/P EST MOD 30 MIN: CPT | Mod: PBBFAC,25

## 2022-09-29 PROCEDURE — 30000890 CELL-FREE DNA PRENATAL SCREEN

## 2022-09-29 PROCEDURE — 85660 RBC SICKLE CELL TEST: CPT

## 2022-09-29 PROCEDURE — 87340 HEPATITIS B SURFACE AG IA: CPT

## 2022-09-29 PROCEDURE — 86901 BLOOD TYPING SEROLOGIC RH(D): CPT | Performed by: STUDENT IN AN ORGANIZED HEALTH CARE EDUCATION/TRAINING PROGRAM

## 2022-09-29 PROCEDURE — 86762 RUBELLA ANTIBODY: CPT

## 2022-09-29 PROCEDURE — 81001 URINALYSIS AUTO W/SCOPE: CPT

## 2022-09-29 PROCEDURE — 85025 COMPLETE CBC W/AUTO DIFF WBC: CPT

## 2022-09-29 PROCEDURE — 87591 N.GONORRHOEAE DNA AMP PROB: CPT

## 2022-09-29 RX ORDER — ASPIRIN 81 MG/1
81 TABLET ORAL DAILY
Qty: 90 TABLET | Refills: 0 | Status: ON HOLD | OUTPATIENT
Start: 2022-09-29 | End: 2023-01-30 | Stop reason: HOSPADM

## 2022-09-29 RX ORDER — CALC/MAG/B COMPLEX/D3/HERB 61
15 TABLET ORAL DAILY
Qty: 30 CAPSULE | Refills: 6 | Status: SHIPPED | OUTPATIENT
Start: 2022-09-29 | End: 2022-11-21 | Stop reason: SDUPTHER

## 2022-09-29 NOTE — PROGRESS NOTES
Initial OB Office Visit Note    Subjective:       Patient ID: Meena Hurtado is a 21 y.o. female.    Chief Complaint: Initial Prenatal Visit (IOB 22w2d. C/O generalized body aches and heartburn.)      Meena Hurtado is a 21 y.o.  at 22w2d WGA by 2nd trimester U/S today  (MARIA EUGENIA: 23).    Current issues: C/o reflux; burning chest discomfort. Pt referred by [facility] OB ED to establish care. No recent prenatal care. Previously following with Dr. Vasquez in [place]; last reported visit in July. Treated for chlamydia by Dr. Vasquez, unknown time frame.     Chronic issues: none reported    Gestational History:   OB History    Para Term  AB Living   3 1 1 0 1 1   SAB IAB Ectopic Multiple Live Births   1 0 0 0 1      # Outcome Date GA Lbr Virgilio/2nd Weight Sex Delivery Anes PTL Lv   3 Current            2 SAB            1 Term              G3: current  G2: 8 wk loss with D&C  G1: 40 WGA, vaginal, no complications    GYNHx:   LMP: Patient's last menstrual period was 03/15/2022 (approximate).   Menarche at 14   Menstrual Hx: irregular, every other month, 4 pads/day, 5-6 days per period  Hx of birth control: Depo-Provera injections   Hx of STDs: chlamydia treat 1 month ago    History of Abnormal PAP: denies        PMHx: No past medical history on file.    PSHx:   Past Surgical History:   Procedure Laterality Date    DILATION AND CURETTAGE OF UTERUS      EYE SURGERY Right        Meds:   Prior to Admission medications    Not on File       Allergies: Review of patient's allergies indicates:  No Known Allergies    SH: denies alcohol, drugs use, and smoking  Social History     Socioeconomic History    Marital status: Single   Tobacco Use    Smoking status: Never    Smokeless tobacco: Never   Substance and Sexual Activity    Alcohol use: Never    Drug use: Never    Sexual activity: Yes     Partners: Male       FHx:   Family History   Problem Relation Age of Onset    Heart disease Mother     Heart disease Maternal  "Grandmother          Review of Systems    Objective:      Vitals:    09/29/22 1138   BP: (!) 92/56   BP Location: Left arm   Patient Position: Sitting   BP Method: Small (Automatic)   Pulse: 92   Resp: 18   Temp: 98.1 °F (36.7 °C)   TempSrc: Oral   SpO2: 100%   Weight: 51.4 kg (113 lb 6.4 oz)   Height: 5' 2" (1.575 m)     Physical exam:   General: in no acute distress  RESP: clear to auscultation bilaterally, non labored  CV: regular rate and rhythm, no murmurs, no edema  ABD: gravid, nontender, BS+  FHTs: 150 bpm  Fundal height: 21 cm  Pelvic: vulva without lesions; vagina without bleeding or discharge; cervix closed        Initial OB Labs: Ordered 9/29/22  - Blood Type and Rh:   - Antibody Screen:   - CBC H/H:   - HIV:   - RPR:   - GC:   - CT:   - HBsAg:   - HCVAb:   - Rubella:   - Varicella:   - UA & Culture:   - Sickle Cell Screen:   - PAP:   - Influenza vaccine date:     15-20 Weeks: Lab Ordered   - Quad Screen:     28 Week Lab: Ordered   - 1H GTT:   - Rhogam:   - Date of Tdap:   - CBC H/H:   - RPR:   - BTL consent:     36 Week Lab: Ordered   - CBC H/H:   - RPR:   - GBS Culture:   - HIV:   - Cervical GC:     Urine dip:  Lab Results   Component Value Date    COLORU Yellow 09/29/2022    SPECGRAV 1.015 09/29/2022    PHUR 7.0 09/29/2022    WBCUR Trace 09/29/2022    NITRITE Negative 09/29/2022    PROTEINPOC Negative 09/29/2022    GLUCOSEUR Negative 09/29/2022    KETONESU Negative 09/29/2022    UROBILINOGEN 1.0 E.U. /dL 09/29/2022    BILIRUBINPOC Negative 09/29/2022    RBCUR Negative 09/29/2022     Assessment:       1. Late prenatal care    2. 22 weeks gestation of pregnancy    3. Positive depression screening           Plan:         Late prenatal care    22 weeks gestation of pregnancy  -     Francesco test, indirect, qualitative; Future; Expected date: 09/29/2022  -     SYPHILIS ANTIBODY (WITH REFLEX RPR); Future; Expected date: 09/29/2022  -     Chlamydia/GC, PCR  -     Sickle Cell Screen; Future; Expected date: " 09/29/2022  -     Varicella Zoster Antibody, IgG; Future; Expected date: 09/29/2022  -     HIV 1/2 Ag/Ab (4th Gen); Future; Expected date: 09/29/2022  -     CBC Auto Differential; Future; Expected date: 09/29/2022  -     Hepatitis C Antibody; Future; Expected date: 09/29/2022  -     Liquid-Based Pap Smear, Screening Screening  -     Type & Screen; Future; Expected date: 09/29/2022  -     Rubella Antibody, IgG; Future; Expected date: 09/29/2022  -     Hepatitis B Surface Antigen; Future; Expected date: 09/29/2022  -     Basic Metabolic Panel; Future; Expected date: 09/29/2022  -     Urinalysis  -     Urine Culture High Risk  -     POCT urine dipstick without microscope  - OB Protocol   - PNVs, ASA  - Urine dip reviewed as above  - Routine (initial) labs: PENDING  - requesting prior records from [facility]  - Free cell DNA added to initial labs  - Mother plans to breast and/or bottlefeed: undecided  - Postpartum contraception discussion: PENDING  - Labor precautions discussed in depth    Depression Screen Positive  - score not communicated while pt in office  - PHQ-9 mild depression  - attempted to contact pt by phone x 3. Will continue to reach out; and send pt letter as well.      GERD  - Prevacid daily        - Return to clinic in 4 weeks w/ continuity clinic

## 2022-09-29 NOTE — PROGRESS NOTES
I have used clinical judgement based on duration and functional status to consider definite necessity for treatment. ***

## 2022-09-29 NOTE — PATIENT INSTRUCTIONS
Well Child Exam    About this topic  A well child exam is a visit with your child's doctor to check your child's health. The doctor will check your child's growth, progress, and shot record. It is also a time for you to ask your child's doctor any questions you have about your child's health. Your child will have a full exam during the office visit. Other things that are sometimes checked are hearing, eyesight, and urine or blood tests. The doctor may give shots during your child's well visit.    General    Getting Ready for a Well Child Exam    A well child exam is a good time for you to talk with your child's doctor about any of these topics:    Eating habits or diet    How your child acts    Sleep issues    Growth    Safety    Vaccines    Toilet training    Teen years    How your child is doing in school or any learning concerns    Home life    You may want to make a written list of the things you want to talk about with your child's doctor. Be sure to bring your list of questions to your child's well visit. You may also want to do some research on your own before your office visit by reading books or looking at Web sites. Other family members, child caregivers, and grandparents may be able to help you too. Your child's doctor may ask also you about your family's health history or if your child is around anyone who smokes.    The Exam    The doctor measures your child's weight, height, and sometimes head size or body mass index (BMI). The doctor plots these numbers on a growth curve. The growth curve gives a picture of your baby's growth at each visit. The doctor may check your child's temperature, blood pressure, breathing, and heart rate. The doctor may listen to your child's heart, lungs, and belly. Your doctor will do a full exam of your child from the head to the toes.    Growth and Development Questions    Your doctor will ask you about your child's progress. The doctor will focus on the skills that are  likely to happen at your child's age. Some of these are motor skills like rolling over, walking, and running, while others are social skills, or how your child interacts with other people. Your child's doctor will also ask you how your child is doing in school.    Help for Parents    Your doctor will talk with you about any concerns you have about your child during this visit. The doctor may also talk with you about:    Getting family help or other support    Ways to help your child's brain growth    How your child plays and acts with others    Ways to help your child exercise    Safety    Eating habits    Vaccines    Quitting smoking    Help if you have a low mood after having a baby    Shots or Vaccines    It is important for your child to get shots on time. This protects from very serious illnesses like pertussis, measles, or some kinds of pneumonia. Sometimes, your child may need more than one dose of vaccine. The vaccines used today are safer than ever. Talk to your doctor if you have any questions or concerns about giving your child vaccines.    Well Child Exam Schedule    The American Academy of Pediatrics (AAP) suggests this plan for well child visits:    [place] (3 to 5 days old)    1 month old    2 months old    4 months old    6 months old    9 months old    12 months old    15 months old    18 months old    2 years old    30 months old    3 years old    4 years old    Once each year until age 21    Well child exams are very important. Since your child is healthy at this visit and it is scheduled ahead of time, you can think about things you want to ask your child's doctor. Be sure to follow the above plan for well child visits as well as any other visits your child's doctor suggests.    Where can I learn more?    Centers for Disease Control and Prevention    http://www.cdc.gov/vaccines     Healthy  Children    https://www.healthychildren.org/English/family-life/health-management/Pages/Well-Child-Care-A-Check-Up-for-Success.aspx    Disclaimer.  This generalized information is a limited summary of diagnosis, treatment, and/or medication information. It is not meant to be comprehensive and should be used as a tool to help the user understand and/or assess potential diagnostic and treatment options. It does NOT include all information about conditions, treatments, medications, side effects, or risks that may apply to a specific patient. It is not intended to be medical advice or a substitute for the medical advice, diagnosis, or treatment of a health care provider based on the health care provider's examination and assessment of a patients specific and unique circumstances. Patients must speak with a health care provider for complete information about their health, medical questions, and treatment options, including any risks or benefits regarding use of medications. This information does not endorse any treatments or medications as safe, effective, or approved for treating a specific patient. UpToDate, Inc. and its affiliates disclaim any warranty or liability relating to this information or the use thereof. The use of this information is governed by the Terms of Use, available at Terms of Use. ©2022 UpToDate, Inc. and its affiliates and/or licensors. All rights reserved.

## 2022-09-30 DIAGNOSIS — Z3A.22 22 WEEKS GESTATION OF PREGNANCY: Primary | ICD-10-CM

## 2022-09-30 LAB — HGB S BLD QL SOLY: NEGATIVE

## 2022-10-01 LAB
BACTERIA UR CULT: NO GROWTH
RUBV IGG SERPL IA-ACNC: 4.5
RUBV IGG SERPL QL IA: POSITIVE
VZV IGG SER IA-ACNC: 3.3
VZV IGG SER QL IA: POSITIVE

## 2022-10-01 NOTE — PROGRESS NOTES
I have personally reviewed the review of systems (ROS) and past, family and social histories (PFSH) documented above by the resident.  I have reviewed the care furnished by the resident during the encounter, including a review of the patient's medical history, the resident's findings on physical examination, diagnosis, and the treatment plan.  I participated in the management of the patient and was immediately available throughout the encounter.   I was physically present during all key portions of the service(s) provided with the resident.  Services were furnished in a primary care center located in the outpatient department of a [facility].

## 2022-10-05 ENCOUNTER — TELEPHONE (OUTPATIENT)
Dept: FAMILY MEDICINE | Facility: CLINIC | Age: 22
End: 2022-10-05
Payer: MEDICAID

## 2022-10-05 ENCOUNTER — PROCEDURE VISIT (OUTPATIENT)
Dept: MATERNAL FETAL MEDICINE | Facility: CLINIC | Age: 22
End: 2022-10-05
Payer: MEDICAID

## 2022-10-05 DIAGNOSIS — Z3A.22 22 WEEKS GESTATION OF PREGNANCY: ICD-10-CM

## 2022-10-05 LAB — BEAKER SEE SCANNED REPORT: NORMAL

## 2022-10-05 PROCEDURE — 76805 US MFM PROCEDURE (VIEWPOINT): ICD-10-PCS | Mod: S$GLB,,, | Performed by: OBSTETRICS & GYNECOLOGY

## 2022-10-05 PROCEDURE — 76805 OB US >/= 14 WKS SNGL FETUS: CPT | Mod: S$GLB,,, | Performed by: OBSTETRICS & GYNECOLOGY

## 2022-10-06 LAB
CHLAMYDIA TRACHOMATIS (PRECISION): NEGATIVE
HIGH RISK HPV 16 (PRECISION): NEGATIVE
HIGH RISK HPV 18/45 (PRECISION): NEGATIVE
INSULIN SERPL-ACNC: NORMAL U[IU]/ML
LAB AP BETHESDA CATEGORY: NORMAL
LAB AP GYN ADDITIONAL FINDINGS: NORMAL
LAB AP GYN MOLECULAR TESTING: NORMAL
LAB AP LMP DATE: NORMAL
LAB AP OCHS PAP SPECIMEN ADEQUACY: NORMAL
LAB AP OHS PAP INTERPRETATION: NORMAL
LAB AP PAP DISCLAIMER COMMENTS: NORMAL
NEISSERIA GONORRHOEAE (PRECISION): NEGATIVE

## 2022-10-17 LAB
GROUP & RH: NORMAL
INDIRECT COOMBS GEL: NORMAL

## 2022-10-24 ENCOUNTER — OFFICE VISIT (OUTPATIENT)
Dept: FAMILY MEDICINE | Facility: CLINIC | Age: 22
End: 2022-10-24
Payer: MEDICAID

## 2022-10-24 VITALS
RESPIRATION RATE: 17 BRPM | HEIGHT: 62 IN | SYSTOLIC BLOOD PRESSURE: 108 MMHG | HEART RATE: 80 BPM | DIASTOLIC BLOOD PRESSURE: 71 MMHG | TEMPERATURE: 98 F | BODY MASS INDEX: 20.98 KG/M2 | WEIGHT: 114 LBS | OXYGEN SATURATION: 100 %

## 2022-10-24 DIAGNOSIS — F32.A DEPRESSION DURING PREGNANCY, ANTEPARTUM: ICD-10-CM

## 2022-10-24 DIAGNOSIS — K21.9 GASTROESOPHAGEAL REFLUX DISEASE, UNSPECIFIED WHETHER ESOPHAGITIS PRESENT: ICD-10-CM

## 2022-10-24 DIAGNOSIS — O99.340 DEPRESSION DURING PREGNANCY, ANTEPARTUM: ICD-10-CM

## 2022-10-24 DIAGNOSIS — O09.30 LATE PRENATAL CARE: ICD-10-CM

## 2022-10-24 DIAGNOSIS — Z3A.25 25 WEEKS GESTATION OF PREGNANCY: Primary | ICD-10-CM

## 2022-10-24 LAB
BILIRUB SERPL-MCNC: NEGATIVE MG/DL
BLOOD URINE, POC: NEGATIVE
CLARITY, POC UA: NORMAL
COLOR, POC UA: YELLOW
GLUCOSE UR QL STRIP: NEGATIVE
KETONES UR QL STRIP: NEGATIVE
LEUKOCYTE ESTERASE URINE, POC: NORMAL
NITRITE, POC UA: NEGATIVE
PH, POC UA: 6
PROTEIN, POC: NORMAL
SPECIFIC GRAVITY, POC UA: 1.03
UROBILINOGEN, POC UA: 1

## 2022-10-24 PROCEDURE — 99214 OFFICE O/P EST MOD 30 MIN: CPT | Mod: PBBFAC

## 2022-10-24 PROCEDURE — 81002 URINALYSIS NONAUTO W/O SCOPE: CPT | Mod: PBBFAC

## 2022-10-24 RX ORDER — SERTRALINE HYDROCHLORIDE 25 MG/1
25 TABLET, FILM COATED ORAL DAILY
Qty: 30 TABLET | Refills: 11 | Status: SHIPPED | OUTPATIENT
Start: 2022-10-24 | End: 2022-11-21 | Stop reason: SDUPTHER

## 2022-10-24 NOTE — PROGRESS NOTES
"OB Office Visit Note    Subjective:       Patient ID: Meena Hurtado is a 21 y.o. female.    Chief Complaint: No chief complaint on file.      Meena Hurtado is a 21 y.o.  at 25w6d WGA by 2nd trimester US.   Estimated Date of Delivery: 23    Meena was concerned about depressed feelings at the previous prenatal visit. PHQ-9 showed mild depression however patient opted to forego treatment at that time. Today, her depressed mood has become more bothersome and is now requesting treatment. She denies suicidal and homicidal ideation.     Taking her prenatal vitamins daily. Esophageal reflux well controlled with Prevacid. Denies vaginal bleeding, discharge, leakage of fluids, headaches, RUQ pain, chest pain, SOB.     OB Hx    LMP: 3/15/2022  Age at menarche: 14 years  Menstrual hx: irregular, every other month, 4 pads/day, 5-6 days per period  History of birth control: Depo-Provera injections  History of STDs and/or Abnormal PAPs: chlamydia treated 2 months ago  Last PAP: 22 no lesions/malignancy  Sexually active with 1 male partner    Gestational History:  G1: vaginal @ 40 WGA, no complications  G2: 8 week loss with D&C  G3: current  Past Medical History: depression  Surgical History: D&C, eye surgery  Social History: UDS THC positive on 22. Denies any smoking or alcohol use.  Medications: Prenatal vitamins, ASA 81 mg, Prevacid 15 mg  Family History: heart disease in mother, heart disease and seizures in maternal grandmother  Allergies: NKDA    Review of Systems  Fetal movements: yes   Vaginal bleeding: none  Vaginal discharge: none  Loss of fluid: none  Contractions: none  Headaches: none  Vision changes: none  Edema: none  Denies fever, fatigue  Denies chest pain, shortness of breath, palpitations  Denies nausea, vomiting, constipation    Objective:     Blood pressure 108/71, pulse 80, temperature 98 °F (36.7 °C), temperature source Oral, resp. rate 17, height 5' 2.01" (1.575 m), weight " 51.7 kg (113 lb 15.7 oz), SpO2 100 %.    Physical Exam  General: flat affect, maintains good eye contact, involved in conversation and asks questions appropriately.   Resp: CTAB, breathing nonlabored  CV: RRR, no murmurs, no pitting edema bilateral  Abd: gravid, nontender, bowel sounds normoactive  Obstetric Exam:   FHTs: 154 bpm  Fundal height: 25 cm       OB Labs  - Blood Type and Rh: O positive  - Antibody Screen: negative  - CBC H/H: 10.6/34.1  - HIV: Nonreactive  - RPR: Nonreactive  - GC: Negative  - CT: Negative  - HBsAg: Nonreactive  - HCVAb: Nonreactive  - Rubella: Immune  - Varicella: Immune  - UA & Culture: No growth  - Sickle Cell Screen: Negative  - PAP: negative for lesion/malignancy, Candida present  - Influenza vaccine date: n/a    15-20 Weeks Lab   - Quad Screen: cell free DNA low risk    28 Week Lab  - 1H GTT: _  - Rhogam: _  - Date of Tdap: _  - CBC H/H: _  - RPR: _    36 Week Lab  - CBC H/H: _  - RPR: _  - GBS Culture: _  - HIV: _  - Urine: GC: _      Urine dip    Component 14:05    Color, UA Yellow    pH, UA 6.0    WBC, UA SMALL    Nitrite, UA NEGATIVE    Protein, POC TRACE    Glucose, UA NEGATIVE    Ketones, UA NEGATIVE    Urobilinogen, UA 1.0    Bilirubin, POC NEGATIVE    Blood, UA NEGATIVE    Clarity, UA Slightly Cloudy    Spec Grav UA 1.030        US  Impression   =========   A bella living IUP is identified.   Fetal size is appropriate for established dating.   No fetal structural malformations are identified; however, the anatomic survey is incomplete and kidneys were not optimal today. The patient will be scheduled for a f/u exam   to complete the anatomic survey.   Amniotic fluid volume is normal.   Cervical length by TA scanning is normal.   Placental location is posterior without evidence of previa.    Assessment & Plan:     1. 25 weeks gestation of pregnancy  - PNVs, ASA 81 mg  - Urine dip reviewed as above  - Mother plans to breast feed  - Postpartum contraception discussion:  Depo-Provera  - Labor precautions discussed    2. Gastroesophageal reflux disease, unspecified whether esophagitis present  - Prevacid 15 mg qd    4. Depression during pregnancy, antepartum  - PHQ-9 mild depression at previous visit  - Sertraline (ZOLOFT) 25 MG tablet; Take 1 tablet (25 mg total) by mouth once daily.  Dispense: 30 tablet; Refill: 11    Follow-up: 4 weeks for routine prenatal care    Dougie Shaw MD  [facility] - PGY-1

## 2022-10-25 NOTE — PROGRESS NOTES
I reviewed History, PE, A/P and chart was reviewed.  Services provided in a teaching facility, I was immediately available  I agree with resident, care reasonable and necessary.   Management discussed with resident at time of visit.    Sonia Woods MD  [facility] - MCKAYLA Cole  [facility]

## 2022-10-31 DIAGNOSIS — Z36.2 ENCOUNTER FOR FOLLOW-UP ULTRASOUND OF FETAL ANATOMY: Primary | ICD-10-CM

## 2022-11-02 ENCOUNTER — PROCEDURE VISIT (OUTPATIENT)
Dept: MATERNAL FETAL MEDICINE | Facility: CLINIC | Age: 22
End: 2022-11-02
Payer: MEDICAID

## 2022-11-02 VITALS — SYSTOLIC BLOOD PRESSURE: 108 MMHG | HEART RATE: 104 BPM | DIASTOLIC BLOOD PRESSURE: 60 MMHG | OXYGEN SATURATION: 98 %

## 2022-11-02 DIAGNOSIS — Z36.2 ENCOUNTER FOR FOLLOW-UP ULTRASOUND OF FETAL ANATOMY: ICD-10-CM

## 2022-11-02 PROCEDURE — 76816 OB US FOLLOW-UP PER FETUS: CPT | Mod: S$GLB,,, | Performed by: OBSTETRICS & GYNECOLOGY

## 2022-11-02 PROCEDURE — 76816 US MFM PROCEDURE (VIEWPOINT): ICD-10-PCS | Mod: S$GLB,,, | Performed by: OBSTETRICS & GYNECOLOGY

## 2022-11-03 ENCOUNTER — HOSPITAL ENCOUNTER (EMERGENCY)
Facility: HOSPITAL | Age: 22
Discharge: HOME | End: 2022-11-03
Payer: MEDICAID

## 2022-11-03 VITALS
DIASTOLIC BLOOD PRESSURE: 61 MMHG | SYSTOLIC BLOOD PRESSURE: 96 MMHG | OXYGEN SATURATION: 100 % | HEART RATE: 80 BPM | TEMPERATURE: 98 F

## 2022-11-03 DIAGNOSIS — R07.9 CHEST PAIN: ICD-10-CM

## 2022-11-03 LAB
ALBUMIN SERPL-MCNC: 2.6 GM/DL (ref 3.5–5)
ALBUMIN/GLOB SERPL: 0.8 RATIO (ref 1.1–2)
ALP SERPL-CCNC: 62 UNIT/L (ref 40–150)
ALT SERPL-CCNC: 7 UNIT/L (ref 0–55)
AMPHET UR QL SCN: NEGATIVE
APPEARANCE UR: CLEAR
AST SERPL-CCNC: 20 UNIT/L (ref 5–34)
BACTERIA #/AREA URNS AUTO: NORMAL /HPF
BARBITURATE SCN PRESENT UR: NEGATIVE
BASOPHILS # BLD AUTO: 0.01 X10(3)/MCL (ref 0–0.2)
BASOPHILS NFR BLD AUTO: 0.2 %
BENZODIAZ UR QL SCN: NEGATIVE
BILIRUB UR QL STRIP.AUTO: NEGATIVE MG/DL
BILIRUBIN DIRECT+TOT PNL SERPL-MCNC: 0.5 MG/DL
BUN SERPL-MCNC: 6.2 MG/DL (ref 7–18.7)
CALCIUM SERPL-MCNC: 8.5 MG/DL (ref 8.4–10.2)
CANNABINOIDS UR QL SCN: POSITIVE
CHLORIDE SERPL-SCNC: 107 MMOL/L (ref 98–107)
CO2 SERPL-SCNC: 26 MMOL/L (ref 22–29)
COCAINE UR QL SCN: NEGATIVE
COLOR UR AUTO: YELLOW
CREAT SERPL-MCNC: 0.64 MG/DL (ref 0.55–1.02)
EOSINOPHIL # BLD AUTO: 0.14 X10(3)/MCL (ref 0–0.9)
EOSINOPHIL NFR BLD AUTO: 2.8 %
ERYTHROCYTE [DISTWIDTH] IN BLOOD BY AUTOMATED COUNT: 13.9 % (ref 11.5–17)
FENTANYL UR QL SCN: NEGATIVE
GFR SERPLBLD CREATININE-BSD FMLA CKD-EPI: >60 MLS/MIN/1.73/M2
GLOBULIN SER-MCNC: 3.2 GM/DL (ref 2.4–3.5)
GLUCOSE SERPL-MCNC: 90 MG/DL (ref 74–100)
GLUCOSE UR QL STRIP.AUTO: NEGATIVE MG/DL
HCT VFR BLD AUTO: 31.2 % (ref 37–47)
HGB BLD-MCNC: 9.3 GM/DL (ref 12–16)
IMM GRANULOCYTES # BLD AUTO: 0.05 X10(3)/MCL (ref 0–0.04)
IMM GRANULOCYTES NFR BLD AUTO: 1 %
KETONES UR QL STRIP.AUTO: NEGATIVE MG/DL
LEUKOCYTE ESTERASE UR QL STRIP.AUTO: ABNORMAL UNIT/L
LYMPHOCYTES # BLD AUTO: 1.34 X10(3)/MCL (ref 0.6–4.6)
LYMPHOCYTES NFR BLD AUTO: 27 %
MCH RBC QN AUTO: 23.8 PG (ref 27–31)
MCHC RBC AUTO-ENTMCNC: 29.8 MG/DL (ref 33–36)
MCV RBC AUTO: 79.8 FL (ref 80–94)
MDMA UR QL SCN: NEGATIVE
MONOCYTES # BLD AUTO: 0.44 X10(3)/MCL (ref 0.1–1.3)
MONOCYTES NFR BLD AUTO: 8.9 %
NEUTROPHILS # BLD AUTO: 3 X10(3)/MCL (ref 2.1–9.2)
NEUTROPHILS NFR BLD AUTO: 60.1 %
NITRITE UR QL STRIP.AUTO: NEGATIVE
NRBC BLD AUTO-RTO: 0 %
OPIATES UR QL SCN: NEGATIVE
PCP UR QL: NEGATIVE
PH UR STRIP.AUTO: 6.5 [PH]
PH UR: 6.5 [PH] (ref 3–11)
PLATELET # BLD AUTO: 195 X10(3)/MCL (ref 130–400)
PMV BLD AUTO: 10.4 FL (ref 7.4–10.4)
POTASSIUM SERPL-SCNC: 3.8 MMOL/L (ref 3.5–5.1)
PROT SERPL-MCNC: 5.8 GM/DL (ref 6.4–8.3)
PROT UR QL STRIP.AUTO: NEGATIVE MG/DL
RBC # BLD AUTO: 3.91 X10(6)/MCL (ref 4.2–5.4)
RBC #/AREA URNS AUTO: <5 /HPF
RBC UR QL AUTO: NEGATIVE UNIT/L
SODIUM SERPL-SCNC: 137 MMOL/L (ref 136–145)
SP GR UR STRIP.AUTO: 1.02 (ref 1–1.03)
SPECIFIC GRAVITY, URINE AUTO (.000) (OHS): 1.02 (ref 1–1.03)
SQUAMOUS #/AREA URNS AUTO: <5 /HPF
TROPONIN I SERPL-MCNC: <0.01 NG/ML (ref 0–0.04)
UROBILINOGEN UR STRIP-ACNC: 1 MG/DL
WBC # SPEC AUTO: 5 X10(3)/MCL (ref 4.5–11.5)
WBC #/AREA URNS AUTO: <5 /HPF

## 2022-11-03 PROCEDURE — 84484 ASSAY OF TROPONIN QUANT: CPT | Performed by: OBSTETRICS & GYNECOLOGY

## 2022-11-03 PROCEDURE — 85025 COMPLETE CBC W/AUTO DIFF WBC: CPT | Performed by: OBSTETRICS & GYNECOLOGY

## 2022-11-03 PROCEDURE — 25500020 PHARM REV CODE 255: Performed by: OBSTETRICS & GYNECOLOGY

## 2022-11-03 PROCEDURE — 93010 ELECTROCARDIOGRAM REPORT: CPT | Mod: ,,, | Performed by: INTERNAL MEDICINE

## 2022-11-03 PROCEDURE — 99285 EMERGENCY DEPT VISIT HI MDM: CPT | Mod: 25

## 2022-11-03 PROCEDURE — 93010 EKG 12-LEAD: ICD-10-PCS | Mod: ,,, | Performed by: INTERNAL MEDICINE

## 2022-11-03 PROCEDURE — 81001 URINALYSIS AUTO W/SCOPE: CPT | Performed by: OBSTETRICS & GYNECOLOGY

## 2022-11-03 PROCEDURE — 25000003 PHARM REV CODE 250: Performed by: OBSTETRICS & GYNECOLOGY

## 2022-11-03 PROCEDURE — 80053 COMPREHEN METABOLIC PANEL: CPT | Performed by: OBSTETRICS & GYNECOLOGY

## 2022-11-03 PROCEDURE — 93005 ELECTROCARDIOGRAM TRACING: CPT

## 2022-11-03 PROCEDURE — 80307 DRUG TEST PRSMV CHEM ANLYZR: CPT | Performed by: OBSTETRICS & GYNECOLOGY

## 2022-11-03 RX ORDER — ACETAMINOPHEN 500 MG
1000 TABLET ORAL
Status: COMPLETED | OUTPATIENT
Start: 2022-11-03 | End: 2022-11-03

## 2022-11-03 RX ADMIN — IOPAMIDOL 100 ML: 755 INJECTION, SOLUTION INTRAVENOUS at 06:11

## 2022-11-03 RX ADMIN — ACETAMINOPHEN 1000 MG: 500 TABLET ORAL at 07:11

## 2022-11-03 NOTE — ED PROVIDER NOTES
ALDA NOTE  [facility]     Admit Date: 11/3/2022  [facility]DA Physician: Cleopatra Hill  Primary OBGYN:  [facility]    Admit Diagnosis/Chief Complaint: Abdominal Pain and chest pain for several weeks  Discharge Diagnosis:  dehydration in pregnancy    Chief Complaint   Patient presents with    Abdominal Pain     Patient states she began experiencing abdominal pain at 1400 today       Hospital Course:  Meena Hurtado is a 21 y.o.  at 27w2d presents complaining of abdominal pain unrelieved with tylenol and several weeks of on and off chest pain/SOA. Denies any cough or sick contacts. No flu like symptoms, denies family or personal hx VTE or leg swelling/pain. Her abdominal pain she says is more like constant discomfort or what she thinks may be Edu Del Rosario from what she has read. She arrives to [facility] via EMS with R PIV in place.   This IUP is complicated by hx depression, GERD and takes low dose ASA.  Patient denies vaginal bleeding, leakage of fluid, contractions, headache, vision changes, RUQ pain, dysuria, fever, and nausea/vomiting.  Fetal Movement: normal.    BP (!) 98/54   Pulse 85   Temp 98.4 °F (36.9 °C) (Oral)   LMP  (LMP Unknown) Comment: MARIA EUGENIA based on 1st trimester US from Dr. Vasquez per pt  SpO2 100%   Temp:  [98.4 °F (36.9 °C)] 98.4 °F (36.9 °C)  Pulse:  [] 85  SpO2:  [94 %-100 %] 100 %  BP: (98)/(54) 98/54    General: in no apparent distress well developed and well nourished non-toxic in no respiratory distress and acyanotic alert oriented times 3 afebrile  Cardiovascular: regular rate and rhythm no murmurs  Respiratory: clear to auscultation, no wheezes, rales or rhonchi, symmetric air entry unlabored  Abdominal: soft, nontender, nondistended, no abnormal masses, no epigastric pain FHT present   S=D  Back: lumbar tenderness absent CVA tenderness none suprapubic tenderness absent  Extremeties no redness or tenderness in the calves or thighs no  edema and neg Nataly's BLE              EFM: appropriate for  modBTV, +accel, no decel (reassuring, reactive)  TOCO: none      Medical Decision Making:      LABS:     Recent Results (from the past 24 hour(s))   Comprehensive Metabolic Panel    Collection Time: 11/03/22  3:23 PM   Result Value Ref Range    Sodium Level 137 136 - 145 mmol/L    Potassium Level 3.8 3.5 - 5.1 mmol/L    Chloride 107 98 - 107 mmol/L    Carbon Dioxide 26 22 - 29 mmol/L    Glucose Level 90 74 - 100 mg/dL    Blood Urea Nitrogen 6.2 (L) 7.0 - 18.7 mg/dL    Creatinine 0.64 0.55 - 1.02 mg/dL    Calcium Level Total 8.5 8.4 - 10.2 mg/dL    Protein Total 5.8 (L) 6.4 - 8.3 gm/dL    Albumin Level 2.6 (L) 3.5 - 5.0 gm/dL    Globulin 3.2 2.4 - 3.5 gm/dL    Albumin/Globulin Ratio 0.8 (L) 1.1 - 2.0 ratio    Bilirubin Total 0.5 <=1.5 mg/dL    Alkaline Phosphatase 62 40 - 150 unit/L    Alanine Aminotransferase 7 0 - 55 unit/L    Aspartate Aminotransferase 20 5 - 34 unit/L    eGFR >60 mls/min/1.73/m2   Troponin I    Collection Time: 11/03/22  3:23 PM   Result Value Ref Range    Troponin-I <0.010 0.000 - 0.045 ng/mL   CBC with Differential    Collection Time: 11/03/22  3:23 PM   Result Value Ref Range    WBC 5.0 4.5 - 11.5 x10(3)/mcL    RBC 3.91 (L) 4.20 - 5.40 x10(6)/mcL    Hgb 9.3 (L) 12.0 - 16.0 gm/dL    Hct 31.2 (L) 37.0 - 47.0 %    MCV 79.8 (L) 80.0 - 94.0 fL    MCH 23.8 (L) 27.0 - 31.0 pg    MCHC 29.8 (L) 33.0 - 36.0 mg/dL    RDW 13.9 11.5 - 17.0 %    Platelet 195 130 - 400 x10(3)/mcL    MPV 10.4 7.4 - 10.4 fL    Neut % 60.1 %    Lymph % 27.0 %    Mono % 8.9 %    Eos % 2.8 %    Basophil % 0.2 %    Lymph # 1.34 0.6 - 4.6 x10(3)/mcL    Neut # 3.0 2.1 - 9.2 x10(3)/mcL    Mono # 0.44 0.1 - 1.3 x10(3)/mcL    Eos # 0.14 0 - 0.9 x10(3)/mcL    Baso # 0.01 0 - 0.2 x10(3)/mcL    IG# 0.05 (H) 0 - 0.04 x10(3)/mcL    IG% 1.0 %    NRBC% 0.0 %   Urinalysis, Reflex to Urine Culture Urine, Clean Catch    Collection Time: 11/03/22  4:07 PM    Specimen: Urine    Result Value Ref Range    Color, UA Yellow Yellow, Light-Yellow, Dark Yellow, Yadira, Straw    Appearance, UA Clear Clear    Specific Gravity, UA 1.020 1.001 - 1.030    pH, UA 6.5 5.0 - 8.5    Protein, UA Negative Negative mg/dL    Glucose, UA Negative Negative, Normal mg/dL    Ketones, UA Negative Negative mg/dL    Blood, UA Negative Negative unit/L    Bilirubin, UA Negative Negative mg/dL    Urobilinogen, UA 1.0 0.2, 1.0, Normal mg/dL    Nitrites, UA Negative Negative    Leukocyte Esterase, UA 1+ (A) Negative unit/L   Drug Screen, Urine    Collection Time: 11/03/22  4:07 PM   Result Value Ref Range    Amphetamines, Urine Negative Negative    Barbituates, Urine Negative Negative    Benzodiazepine, Urine Negative Negative    Cannabinoids, Urine Positive (A) Negative    Cocaine, Urine Negative Negative    Fentanyl, Urine Negative Negative    MDMA, Urine Negative Negative    Opiates, Urine Negative Negative    Phencyclidine, Urine Negative Negative    pH, Urine 6.5 3.0 - 11.0    Specific Gravity, Urine Auto 1.020 1.001 - 1.035   Urinalysis, Microscopic    Collection Time: 11/03/22  4:07 PM   Result Value Ref Range    RBC, UA <5 <=5 /HPF    WBC, UA <5 <=5 /HPF    Squamous Epithelial Cells, UA <5 <=5 /HPF    Bacteria, UA None Seen None Seen, Rare, Occasional /HPF       Imaging Results              CTA Chest Non-Coronary (PE Studies) (Final result)  Result time 11/03/22 18:27:53      Final result by Todd Alberto MD (11/03/22 18:27:53)                   Impression:      No CT evidence for pulmonary embolus or acute intrathoracic process.      Electronically signed by: Todd Alberto MD  Date:    11/03/2022  Time:    18:27               Narrative:    EXAMINATION:  CTA chest    CLINICAL HISTORY:  Chest pain    TECHNIQUE:  Routine CT angiogram of the chest was performed intravenous contrast. 3D MIP images are obtained    Total DLP: 937 mGy.cm    Automatic exposure control was utilized to reduce the patient's  dose    COMPARISON:  None .    FINDINGS:  No intraluminal filling defects within the pulmonary arteries to suggest pulmonary embolus.  The thoracic aorta is normal caliber. The heart is normal in size. No pericardial pleural effusion.  There is a partially seen cyst noted in the upper pole of the right kidney.  The visualized upper abdomen is otherwise unremarkable.  No axillary, hilar, mediastinal adenopathy.  The central airways are patent and unremarkable.  No confluent airspace disease.  No pneumothorax.  No acute osseous finding.                                       ASSESMENT: Meena Hurtado is a 21 y.o.   at 27w2d with dehydration and abdominal/chest pain that has resolved with IV fluids and negative workup per above    Discharge Diagnosis:   Patient Active Problem List   Diagnosis    Abdominal cramping affecting pregnancy       Status:Improved    Disposition:  discharged to home    Patient Instructions:   - Pt was given routine pregnancy instructions including to return to triage if she had any vaginal bleeding (other than spotting for the next 48hrs), any loss of fluid like her water broke, decreased fetal movement, or contractions Q 5min lasting for 2 or more hours. Pt was also instructed to drink copious water. Patient voiced understanding of all these instructions and was subsequently discharged home. Tylenol use and maternity belt use discussed. All questions answered. Pt left [facility] with good understanding of plan.   Preeclampsia/ROM/labor/fever/decreased FM with C precautions discussed, voiced understanding     She will follow up with her primary OB as scheduled    Cleopatra Hill MD  OB/GYN Hospitalist  3:21 PM 2022

## 2022-11-04 NOTE — DISCHARGE INSTRUCTIONS
Discharge instructions given to patient and reviewed. Patient given time to ask questions and questions answered. Patient educated on when to return to hospital and importance of prenatal visits with primary OB. Patient verbally understands and agrees to discharge. Patient ambulated off unit to private vehicle.

## 2022-11-17 DIAGNOSIS — Z36.2 ENCOUNTER FOR FOLLOW-UP ULTRASOUND OF FETAL ANATOMY: Primary | ICD-10-CM

## 2022-11-21 ENCOUNTER — OFFICE VISIT (OUTPATIENT)
Dept: FAMILY MEDICINE | Facility: CLINIC | Age: 22
End: 2022-11-21
Payer: MEDICAID

## 2022-11-21 VITALS
HEIGHT: 62 IN | WEIGHT: 121 LBS | SYSTOLIC BLOOD PRESSURE: 118 MMHG | TEMPERATURE: 98 F | RESPIRATION RATE: 20 BRPM | OXYGEN SATURATION: 97 % | DIASTOLIC BLOOD PRESSURE: 72 MMHG | HEART RATE: 84 BPM | BODY MASS INDEX: 22.26 KG/M2

## 2022-11-21 DIAGNOSIS — O99.340 DEPRESSION DURING PREGNANCY, ANTEPARTUM: ICD-10-CM

## 2022-11-21 DIAGNOSIS — Z3A.29 29 WEEKS GESTATION OF PREGNANCY: Primary | ICD-10-CM

## 2022-11-21 DIAGNOSIS — K21.9 GASTROESOPHAGEAL REFLUX DISEASE, UNSPECIFIED WHETHER ESOPHAGITIS PRESENT: ICD-10-CM

## 2022-11-21 DIAGNOSIS — F32.A DEPRESSION DURING PREGNANCY, ANTEPARTUM: ICD-10-CM

## 2022-11-21 LAB
BILIRUB SERPL-MCNC: NEGATIVE MG/DL
BLOOD URINE, POC: NEGATIVE
C TRACH DNA SPEC QL NAA+PROBE: NOT DETECTED
CLARITY, POC UA: CLEAR
COLOR, POC UA: YELLOW
GLUCOSE UR QL STRIP: NEGATIVE
KETONES UR QL STRIP: NEGATIVE
LEUKOCYTE ESTERASE URINE, POC: NEGATIVE
N GONORRHOEA DNA SPEC QL NAA+PROBE: NOT DETECTED
NITRITE, POC UA: NEGATIVE
PH, POC UA: 7.5
PROTEIN, POC: NEGATIVE
SPECIFIC GRAVITY, POC UA: 1.01
UROBILINOGEN, POC UA: NORMAL

## 2022-11-21 PROCEDURE — 99214 OFFICE O/P EST MOD 30 MIN: CPT | Mod: PBBFAC

## 2022-11-21 PROCEDURE — 87591 N.GONORRHOEAE DNA AMP PROB: CPT

## 2022-11-21 PROCEDURE — 81002 URINALYSIS NONAUTO W/O SCOPE: CPT | Mod: PBBFAC

## 2022-11-21 PROCEDURE — 87491 CHLMYD TRACH DNA AMP PROBE: CPT

## 2022-11-21 RX ORDER — MULTIVIT-MIN69/IRON/FOLIC ACID 50-1.25 MG
1 TABLET ORAL DAILY
COMMUNITY
Start: 2022-10-19

## 2022-11-21 RX ORDER — SERTRALINE HYDROCHLORIDE 25 MG/1
25 TABLET, FILM COATED ORAL DAILY
Qty: 30 TABLET | Refills: 1 | Status: SHIPPED | OUTPATIENT
Start: 2022-11-21 | End: 2023-01-20

## 2022-11-21 RX ORDER — CALC/MAG/B COMPLEX/D3/HERB 61
15 TABLET ORAL DAILY
Qty: 30 CAPSULE | Refills: 1 | Status: SHIPPED | OUTPATIENT
Start: 2022-11-21 | End: 2022-12-14 | Stop reason: SDUPTHER

## 2022-11-21 NOTE — PROGRESS NOTES
[facility] Clinic Note    Subjective:      Chief Complaint: Routine Prenatal Visit (29wks, 6d)      Patient ID: Meena Hurtado is a 22 y.o. yo  at 29^6WGA by LMP consistent with 2nd trimester WGA US. (MARIA EUGENIA: 23)    Current Concerns:   Recent seen in [facility] 11/3/22 for abd pain and chestpain with SOB on and off. CT w/ PE protocol without evidence of PE. Was given IV fluid with resolution of sxs    GERD:  On prevacid 15mg qD; didn't know she had it; occurs every night,  sxs bothersome, wasn't sure she could have tums and prevacid    Depression:  - started zoloft 25mg daily - pharmacy did not have it, did not pick it up; reports feeling slightly better but still feels like she needs pharmacotherapy.    Gyn Hx  LMP: 3/15/2022  Age at menarche: 14 years  Menstrual hx: irregular, every other month, 4 pads/day, 5-6 days per period  History of birth control: Depo-Provera injections  History of STDs and/or Abnormal PAPs: chlamydia treated 2 months ago  Last PAP: 22 no lesions/malignancy  Sexually active with 1 male partner    Gestational History:  G1: vaginal @ 40 WGA, no complications  G2: 8 week loss with D&C  G3: current    Past Medical History: depression  Surgical History: D&C, eye surgery  Social History: UDS THC positive on 22. Denies any smoking or alcohol use.  Medications: Prenatal vitamins, ASA 81 mg, Prevacid 15 mg  Family History: heart disease in mother, heart disease and seizures in maternal grandmother  Allergies: NKDA    Antepartum specific ROS  - Fetal movements: yes, a lot  - Vaginal bleeding: no  - Vaginal discharge: no, from time to time - less than before, white; only seen after its dried on underwear  - Loss of fluid: no  - Contractions: no  - Headaches: no  - Vision changes: blurry vision, never had eyes checked, resolves with hydration and rest  - Edema: no    General ROS  Constitutional: no fever, no chills, no weight loss  CV: no swelling, no edema, +chest pain with pressure  :  "no urinary retention, no urinary incontinence, denies dysuria  GI: no fecal incontinence, no constipation, no diarrhea, no nausea, no vomiting  RESP: no SOB, no wheezing, no difficulty breathing  Psych: no depression, no anxiety     Objective:      /72 (BP Location: Right arm, Patient Position: Sitting)   Pulse 84   Temp 97.7 °F (36.5 °C) (Oral)   Resp 20   Ht 5' 2" (1.575 m)   Wt 54.9 kg (121 lb)   LMP  (LMP Unknown) Comment: MARIA EUGENIA based on 1st trimester US from Dr. Vasquez per pt  SpO2 97%   BMI 22.13 kg/m²   Physical Exam:  Gen: alert and oriented, NAD  Resp: CTA bilaterally, nonlabored breathing  CV: RRR, no murmurs, no edema  Abd: gravid, nontender, +BS  - FHTs: 148-151 bpm  - Fundal height: 30cm    Ultrasound:  Impression   =========   A bella living IUP is identified.   Fetal size is appropriate for established dating.   No fetal structural malformations are identified; however, the anatomic survey is incomplete and kidneys were not optimal today. The patient will be scheduled for a f/u exam   to complete the anatomic survey.   Amniotic fluid volume is normal.   Cervical length by TA scanning is normal.   Placental location is posterior without evidence of previa.    Initial OB Labs:  - Blood Type and Rh: O positive  - Antibody Screen: negative  - CBC H/H: 10.6/34.1  - HIV: Nonreactive  - RPR: Nonreactive  - GC: Negative  - CT: Negative  - HBsAg: Nonreactive  - HCVAb: Nonreactive  - Rubella: Immune  - Varicella: Immune  - UA & Culture: No growth  - Sickle Cell Screen: Negative  - PAP: negative for lesion/malignancy, Candida present  - Influenza vaccine date: n/a    15-20 Weeks Lab   - Quad Screen: cell free DNA low risk    28 Week Lab - phlebotomist gone for the day, obtain during nurse visit tomorrow  - 1H GTT: _  - Rhogam: not indicated  - Date of Tdap: declined  - CBC H/H: _  - RPR: _    36 Week Lab:  - CBC H/H:   - Syphilis Ab:   - HIV:   - GBS Culture:   - Cervical GC:       Current " Outpatient Medications:     aspirin (ECOTRIN) 81 MG EC tablet, Take 1 tablet (81 mg total) by mouth once daily., Disp: 90 tablet, Rfl: 0    lansoprazole (PREVACID) 15 MG capsule, Take 1 capsule (15 mg total) by mouth once daily., Disp: 30 capsule, Rfl: 6    prenatal vit 10-iron fum-folic 65-1 mg Tab, Take 1 tablet by mouth once daily., Disp: 90 tablet, Rfl: 0    sertraline (ZOLOFT) 25 MG tablet, Take 1 tablet (25 mg total) by mouth once daily., Disp: 30 tablet, Rfl: 11     Lab Results   Component Value Date    COLORU Yellow 10/24/2022    SPECGRAV 1.030 10/24/2022    PHUR 6.0 10/24/2022    WBCUR SMALL 10/24/2022    NITRITE NEGATIVE 10/24/2022    PROTEINPOC TRACE 10/24/2022    GLUCOSEUR NEGATIVE 10/24/2022    KETONESU NEGATIVE 10/24/2022    UROBILINOGEN 1.0 11/03/2022    BILIRUBINPOC NEGATIVE 10/24/2022    RBCUR NEGATIVE 10/24/2022        Assessment/Plan:   1. 29 weeks gestation of pregnancy  - OB protocol  - cont PNVs  - Urine Dipstick reviewed  - Routine labs reviewed  - Strict labor and ED precautions discussed in depth: Fever, vaginal bleeding or leaking fluid, belly cramping or pain, shortness of breath, chest pain, swelling of the face, hands, ankles, feet, or leg; decreased fetal movement, changes in vision, severe headache that does not resolve with rest or Tylenol.      Phlebotomist gone for the day, unable to draw labs during visit. Pt amenable to return for 1hr GTT and blood work during nurse visit tomorrow.    - POCT urine dipstick without microscope  - CBC Auto Differential  - SYPHILIS ANTIBODY (WITH REFLEX RPR); Future  - GTT 1 Hour; Future  - Chlamydia/GC, PCR  - Wet Prep, Genital    2. Depression during pregnancy, antepartum  PHQ-9 score 8, rx for zoloft resent to pharmacy  - sertraline (ZOLOFT) 25 MG tablet; Take 1 tablet (25 mg total) by mouth once daily.  Dispense: 30 tablet; Refill: 1    3. Gastroesophageal reflux disease, unspecified whether esophagitis present  Pt prefers trial of tums for GERD,  was not aware she could have that. Would like to have prevacid rx in case tums does not work  - lansoprazole (PREVACID) 15 MG capsule; Take 1 capsule (15 mg total) by mouth once daily.  Dispense: 30 capsule; Refill: 1    RTC 2 wks

## 2022-11-21 NOTE — PROGRESS NOTES
I have discussed the case with the resident and reviewed the resident's history and physical, assessment, plan, and progress note. I agree with the findings.       Gabriele Deleon MD  [facility]

## 2022-12-01 ENCOUNTER — HOSPITAL ENCOUNTER (EMERGENCY)
Facility: HOSPITAL | Age: 22
Discharge: ANOTHER HOSPITAL | End: 2022-12-01
Attending: INTERNAL MEDICINE
Payer: MEDICAID

## 2022-12-01 VITALS
DIASTOLIC BLOOD PRESSURE: 54 MMHG | OXYGEN SATURATION: 100 % | TEMPERATURE: 98 F | HEIGHT: 62 IN | HEART RATE: 80 BPM | WEIGHT: 121.81 LBS | RESPIRATION RATE: 23 BRPM | BODY MASS INDEX: 22.41 KG/M2 | SYSTOLIC BLOOD PRESSURE: 92 MMHG

## 2022-12-01 DIAGNOSIS — E86.0 DEHYDRATION: ICD-10-CM

## 2022-12-01 DIAGNOSIS — I47.10 SVT (SUPRAVENTRICULAR TACHYCARDIA): Primary | ICD-10-CM

## 2022-12-01 DIAGNOSIS — Z3A.31 31 WEEKS GESTATION OF PREGNANCY: ICD-10-CM

## 2022-12-01 DIAGNOSIS — R07.9 CHEST PAIN: ICD-10-CM

## 2022-12-01 LAB
ALBUMIN SERPL-MCNC: 2.5 GM/DL (ref 3.5–5)
ALBUMIN/GLOB SERPL: 0.7 RATIO (ref 1.1–2)
ALP SERPL-CCNC: 75 UNIT/L (ref 40–150)
ALT SERPL-CCNC: 6 UNIT/L (ref 0–55)
AMPHET UR QL SCN: NEGATIVE
AST SERPL-CCNC: 22 UNIT/L (ref 5–34)
B-HCG SERPL QL: POSITIVE
BARBITURATE SCN PRESENT UR: NEGATIVE
BASOPHILS # BLD AUTO: 0.04 X10(3)/MCL (ref 0–0.2)
BASOPHILS NFR BLD AUTO: 0.8 %
BENZODIAZ UR QL SCN: NEGATIVE
BILIRUBIN DIRECT+TOT PNL SERPL-MCNC: 0.7 MG/DL
BNP BLD-MCNC: 13.5 PG/ML
BUN SERPL-MCNC: 6 MG/DL (ref 7–18.7)
CALCIUM SERPL-MCNC: 8.5 MG/DL (ref 8.4–10.2)
CANNABINOIDS UR QL SCN: POSITIVE
CHLORIDE SERPL-SCNC: 106 MMOL/L (ref 98–107)
CO2 SERPL-SCNC: 22 MMOL/L (ref 22–29)
COCAINE UR QL SCN: NEGATIVE
CREAT SERPL-MCNC: 0.66 MG/DL (ref 0.55–1.02)
EOSINOPHIL # BLD AUTO: 0.12 X10(3)/MCL (ref 0–0.9)
EOSINOPHIL NFR BLD AUTO: 2.4 %
ERYTHROCYTE [DISTWIDTH] IN BLOOD BY AUTOMATED COUNT: 15.5 % (ref 11.5–17)
ETHANOL SERPL-MCNC: <10 MG/DL
FENTANYL UR QL SCN: NEGATIVE
FLUAV AG UPPER RESP QL IA.RAPID: NOT DETECTED
FLUBV AG UPPER RESP QL IA.RAPID: NOT DETECTED
GFR SERPLBLD CREATININE-BSD FMLA CKD-EPI: >60 MLS/MIN/1.73/M2
GLOBULIN SER-MCNC: 3.7 GM/DL (ref 2.4–3.5)
GLUCOSE SERPL-MCNC: 113 MG/DL (ref 74–100)
HCT VFR BLD AUTO: 32.7 % (ref 37–47)
HGB BLD-MCNC: 10.1 GM/DL (ref 12–16)
IMM GRANULOCYTES # BLD AUTO: 0.03 X10(3)/MCL (ref 0–0.04)
IMM GRANULOCYTES NFR BLD AUTO: 0.6 %
LYMPHOCYTES # BLD AUTO: 2.05 X10(3)/MCL (ref 0.6–4.6)
LYMPHOCYTES NFR BLD AUTO: 41.1 %
MAGNESIUM SERPL-MCNC: 1.4 MG/DL (ref 1.6–2.6)
MCH RBC QN AUTO: 24.1 PG (ref 27–31)
MCHC RBC AUTO-ENTMCNC: 30.9 MG/DL (ref 33–36)
MCV RBC AUTO: 78 FL (ref 80–94)
MDMA UR QL SCN: NEGATIVE
MONOCYTES # BLD AUTO: 0.5 X10(3)/MCL (ref 0.1–1.3)
MONOCYTES NFR BLD AUTO: 10 %
NEUTROPHILS # BLD AUTO: 2.3 X10(3)/MCL (ref 2.1–9.2)
NEUTROPHILS NFR BLD AUTO: 45.1 %
OPIATES UR QL SCN: NEGATIVE
PCP UR QL: NEGATIVE
PH UR: 7 [PH] (ref 3–11)
PLATELET # BLD AUTO: 215 X10(3)/MCL (ref 130–400)
PMV BLD AUTO: 10.8 FL (ref 7.4–10.4)
POTASSIUM SERPL-SCNC: 3.4 MMOL/L (ref 3.5–5.1)
PROT SERPL-MCNC: 6.2 GM/DL (ref 6.4–8.3)
RBC # BLD AUTO: 4.19 X10(6)/MCL (ref 4.2–5.4)
SARS-COV-2 RNA RESP QL NAA+PROBE: NOT DETECTED
SODIUM SERPL-SCNC: 138 MMOL/L (ref 136–145)
SPECIFIC GRAVITY, URINE AUTO (.000) (OHS): 1.02 (ref 1–1.03)
TROPONIN I SERPL-MCNC: 0.02 NG/ML (ref 0–0.04)
WBC # SPEC AUTO: 5 X10(3)/MCL (ref 4.5–11.5)

## 2022-12-01 PROCEDURE — 25000003 PHARM REV CODE 250: Performed by: STUDENT IN AN ORGANIZED HEALTH CARE EDUCATION/TRAINING PROGRAM

## 2022-12-01 PROCEDURE — 96360 HYDRATION IV INFUSION INIT: CPT

## 2022-12-01 PROCEDURE — 80053 COMPREHEN METABOLIC PANEL: CPT | Performed by: INTERNAL MEDICINE

## 2022-12-01 PROCEDURE — 99285 EMERGENCY DEPT VISIT HI MDM: CPT | Mod: 25

## 2022-12-01 PROCEDURE — 82077 ASSAY SPEC XCP UR&BREATH IA: CPT | Performed by: STUDENT IN AN ORGANIZED HEALTH CARE EDUCATION/TRAINING PROGRAM

## 2022-12-01 PROCEDURE — 83880 ASSAY OF NATRIURETIC PEPTIDE: CPT | Performed by: INTERNAL MEDICINE

## 2022-12-01 PROCEDURE — 83735 ASSAY OF MAGNESIUM: CPT | Performed by: STUDENT IN AN ORGANIZED HEALTH CARE EDUCATION/TRAINING PROGRAM

## 2022-12-01 PROCEDURE — 85025 COMPLETE CBC W/AUTO DIFF WBC: CPT | Performed by: INTERNAL MEDICINE

## 2022-12-01 PROCEDURE — 84484 ASSAY OF TROPONIN QUANT: CPT | Performed by: INTERNAL MEDICINE

## 2022-12-01 PROCEDURE — 81025 URINE PREGNANCY TEST: CPT | Performed by: STUDENT IN AN ORGANIZED HEALTH CARE EDUCATION/TRAINING PROGRAM

## 2022-12-01 PROCEDURE — 0240U COVID/FLU A&B PCR: CPT | Performed by: STUDENT IN AN ORGANIZED HEALTH CARE EDUCATION/TRAINING PROGRAM

## 2022-12-01 PROCEDURE — 25000003 PHARM REV CODE 250: Performed by: INTERNAL MEDICINE

## 2022-12-01 PROCEDURE — 80307 DRUG TEST PRSMV CHEM ANLYZR: CPT | Performed by: STUDENT IN AN ORGANIZED HEALTH CARE EDUCATION/TRAINING PROGRAM

## 2022-12-01 RX ORDER — ASPIRIN 325 MG
325 TABLET ORAL
Status: DISCONTINUED | OUTPATIENT
Start: 2022-12-01 | End: 2022-12-01

## 2022-12-01 RX ORDER — SODIUM CHLORIDE 9 MG/ML
1000 INJECTION, SOLUTION INTRAVENOUS
Status: COMPLETED | OUTPATIENT
Start: 2022-12-01 | End: 2022-12-01

## 2022-12-01 RX ADMIN — SODIUM CHLORIDE 1000 ML: 9 INJECTION, SOLUTION INTRAVENOUS at 07:12

## 2022-12-01 RX ADMIN — SODIUM CHLORIDE 1000 ML: 9 INJECTION, SOLUTION INTRAVENOUS at 06:12

## 2022-12-01 RX ADMIN — POTASSIUM BICARBONATE 40 MEQ: 782 TABLET, EFFERVESCENT ORAL at 06:12

## 2022-12-02 NOTE — ED PROVIDER NOTES
Encounter Date: 2022       History     Chief Complaint   Patient presents with    Dizziness     C/o dizziness and nausea that started just PTA. 31 weeks pregnant. EDC 23. G3, P1, AB1     HPI    22-year-old  at 31 weeks gestational age, obese [facility], who presents emergency department for generalized weakness that started earlier today.  Patient states she felt weak and dizzy.  States she does not know what may have caused this.  States she was feeling fine earlier today.  Denies any decrease fetal movement.  No vaginal bleeding.  No contractions.    Review of patient's allergies indicates:  No Known Allergies  Past Medical History:   Diagnosis Date    Mental disorder     depression     Past Surgical History:   Procedure Laterality Date    DILATION AND CURETTAGE OF UTERUS      EYE SURGERY Right      Family History   Problem Relation Age of Onset    Heart disease Maternal Grandmother     Seizures Maternal Grandmother     Heart disease Mother      Social History     Tobacco Use    Smoking status: Former     Types: Cigarettes     Quit date: 2022     Years since quittin.2    Smokeless tobacco: Never   Substance Use Topics    Alcohol use: Not Currently    Drug use: Not Currently     Types: Marijuana     Review of Systems   Constitutional:  Positive for fatigue. Negative for fever.   Respiratory:  Negative for cough and shortness of breath.    Cardiovascular:  Positive for palpitations. Negative for chest pain.   Gastrointestinal:  Negative for abdominal pain.   Genitourinary:  Negative for vaginal bleeding, vaginal discharge and vaginal pain.   Neurological:  Positive for dizziness.   All other systems reviewed and are negative.    Physical Exam     Initial Vitals [22 1804]   BP Pulse Resp Temp SpO2   (!) 75/37 (!) 202 16 98.4 °F (36.9 °C) 100 %      MAP       --         Physical Exam    Nursing note and vitals reviewed.  Constitutional: She appears well-developed and well-nourished. No  distress.   Cardiovascular:  Normal rate and regular rhythm.           Pulmonary/Chest: Breath sounds normal. No respiratory distress.   Abdominal: Abdomen is soft. Bowel sounds are normal.   Gravid, active fetal movement   Genitourinary:    No vaginal discharge.      Genitourinary Comments: Cervix is closed; fetal heart tone 132 beats per minute     Musculoskeletal:         General: No tenderness. Normal range of motion.     Neurological: She is alert and oriented to person, place, and time.   Skin: Skin is warm. Capillary refill takes less than 2 seconds.   Psychiatric: She has a normal mood and affect. Thought content normal.       ED Course   Procedures  Labs Reviewed   COMPREHENSIVE METABOLIC PANEL - Abnormal; Notable for the following components:       Result Value    Potassium Level 3.4 (*)     Glucose Level 113 (*)     Blood Urea Nitrogen 6.0 (*)     Protein Total 6.2 (*)     Albumin Level 2.5 (*)     Globulin 3.7 (*)     Albumin/Globulin Ratio 0.7 (*)     All other components within normal limits   CBC WITH DIFFERENTIAL - Abnormal; Notable for the following components:    RBC 4.19 (*)     Hgb 10.1 (*)     Hct 32.7 (*)     MCV 78.0 (*)     MCH 24.1 (*)     MCHC 30.9 (*)     MPV 10.8 (*)     All other components within normal limits   PREGNANCY TEST, URINE RAPID - Abnormal; Notable for the following components:    Beta hCG Qualitative, Urine Positive (*)     All other components within normal limits   DRUG SCREEN, URINE (BEAKER) - Abnormal; Notable for the following components:    Cannabinoids, Urine Positive (*)     All other components within normal limits    Narrative:     Cut off concentrations:    Amphetamines - 1000 ng/ml  Barbiturates - 200 ng/ml  Benzodiazepine - 200 ng/ml  Cannabinoids (THC) - 50 ng/ml  Cocaine - 300 ng/ml  Fentanyl - 1.0 ng/ml  MDMA - 500 ng/ml  Opiates - 300 ng/ml   Phencyclidine (PCP) - 25 ng/ml    Specimen submitted for drug analysis and tested for pH and specific gravity in  order to evaluate sample integrity. Suspect tampering if specific gravity is <1.003 and/or pH is not within the range of 4.5 - 8.0  False negatives may result form substances such as bleach added to urine.  False positives may result for the presence of a substance with similar chemical structure to the drug or its metabolite.    This test provides only a PRELIMINARY analytical test result. A more specific alternate chemical method must be used in order to obtain a confirmed analytical result. Gas chromatography/mass spectrometry (GC/MS) is the preferred confirmatory method. Other chemical confirmation methods are available. Clinical consideration and professional judgement should be applied to any drug of abuse test result, particularly when preliminary positive results are used.    Positive results will be confirmed only at the physicians request. Unconfirmed screening results are to be used only for medical purposes (treatment).        MAGNESIUM - Abnormal; Notable for the following components:    Magnesium Level 1.40 (*)     All other components within normal limits   TROPONIN I - Normal   B-TYPE NATRIURETIC PEPTIDE - Normal   ALCOHOL,MEDICAL (ETHANOL) - Normal   COVID/FLU A&B PCR - Normal    Narrative:     The Xpert Xpress SARS-CoV-2/FLU/RSV plus is a rapid, multiplexed real-time PCR test intended for the simultaneous qualitative detection and differentiation of SARS-CoV-2, Influenza A, Influenza B, and respiratory syncytial virus (RSV) viral RNA in either nasopharyngeal swab or nasal swab specimens.         CBC W/ AUTO DIFFERENTIAL    Narrative:     The following orders were created for panel order CBC auto differential.  Procedure                               Abnormality         Status                     ---------                               -----------         ------                     CBC with Differential[[id number]]        Abnormal            Final result                 Please view results for these  tests on the individual orders.     EKG Readings: (Independently Interpreted)   Initial Reading: No STEMI. Rhythm: Normal Sinus Rhythm. Heart Rate: 85. Ectopy: No Ectopy. Conduction: Normal. ST Segments: Normal ST Segments. T Waves: Normal. Clinical Impression: Normal Sinus Rhythm     Imaging Results    None          Medications   sodium chloride 0.9% bolus 1,000 mL (0 mLs Intravenous Stopped 12/1/22 1920)   0.9%  NaCl infusion (1,000 mLs Intravenous New Bag 12/1/22 1923)   potassium bicarbonate disintegrating tablet 40 mEq (40 mEq Oral Given 12/1/22 1847)     Medical Decision Making:   Differential Diagnosis:   SVT, dehydration, metabolic derangement, labor           ED Course as of 12/01/22 2125   Thu Dec 01, 2022   2021 Dr. Davidson at [facility] accepts pt for transfer but will speak with the ED OB and see if it is more reasonable to go there directly. [BS]   2027 Dr. Myers, Ob ED physician, denies transfer since they are on in ICU divert since mother is at only 31 weeks gestational age [BS]   2118 Per chart review it seems that patient's blood pressure is usually in the 90s systolically. [BS]   2124 Dr[facility] [BS]      ED Course User Index  [BS] Marc Francisco MD                 Clinical Impression:   Final diagnoses:  [R07.9] Chest pain  [I47.1] SVT (supraventricular tachycardia) (Primary)  [E86.0] Dehydration  [Z3A.31] 31 weeks gestation of pregnancy        ED Disposition Condition    Transfer to Another Facility Stable                Marc Francisco MD  12/01/22 2125

## 2022-12-07 ENCOUNTER — PROCEDURE VISIT (OUTPATIENT)
Dept: MATERNAL FETAL MEDICINE | Facility: CLINIC | Age: 22
End: 2022-12-07
Payer: MEDICAID

## 2022-12-07 DIAGNOSIS — Z36.2 ENCOUNTER FOR FOLLOW-UP ULTRASOUND OF FETAL ANATOMY: ICD-10-CM

## 2022-12-07 PROCEDURE — 76819 FETAL BIOPHYS PROFIL W/O NST: CPT | Mod: S$GLB,,, | Performed by: OBSTETRICS & GYNECOLOGY

## 2022-12-07 PROCEDURE — 76816 OB US FOLLOW-UP PER FETUS: CPT | Mod: S$GLB,,, | Performed by: OBSTETRICS & GYNECOLOGY

## 2022-12-07 PROCEDURE — 76816 US MFM PROCEDURE (VIEWPOINT): ICD-10-PCS | Mod: S$GLB,,, | Performed by: OBSTETRICS & GYNECOLOGY

## 2022-12-07 PROCEDURE — 76819 US MFM PROCEDURE (VIEWPOINT): ICD-10-PCS | Mod: S$GLB,,, | Performed by: OBSTETRICS & GYNECOLOGY

## 2022-12-14 ENCOUNTER — OFFICE VISIT (OUTPATIENT)
Dept: FAMILY MEDICINE | Facility: CLINIC | Age: 22
End: 2022-12-14
Payer: MEDICAID

## 2022-12-14 VITALS
TEMPERATURE: 98 F | DIASTOLIC BLOOD PRESSURE: 59 MMHG | OXYGEN SATURATION: 100 % | WEIGHT: 125 LBS | SYSTOLIC BLOOD PRESSURE: 97 MMHG | HEIGHT: 62 IN | BODY MASS INDEX: 23 KG/M2 | RESPIRATION RATE: 18 BRPM | HEART RATE: 87 BPM

## 2022-12-14 DIAGNOSIS — Z3A.33 33 WEEKS GESTATION OF PREGNANCY: Primary | ICD-10-CM

## 2022-12-14 DIAGNOSIS — K21.9 GASTROESOPHAGEAL REFLUX DISEASE, UNSPECIFIED WHETHER ESOPHAGITIS PRESENT: ICD-10-CM

## 2022-12-14 LAB
BILIRUB SERPL-MCNC: NORMAL MG/DL
BLOOD URINE, POC: NEGATIVE
CLARITY, POC UA: CLEAR
COLOR, POC UA: YELLOW
GLUCOSE UR QL STRIP: NEGATIVE
KETONES UR QL STRIP: NEGATIVE
LEUKOCYTE ESTERASE URINE, POC: NEGATIVE
NITRITE, POC UA: NEGATIVE
PH, POC UA: 6.5
PROTEIN, POC: NORMAL
SPECIFIC GRAVITY, POC UA: >=1.03
UROBILINOGEN, POC UA: NORMAL

## 2022-12-14 PROCEDURE — 99214 OFFICE O/P EST MOD 30 MIN: CPT | Mod: PBBFAC

## 2022-12-14 PROCEDURE — 81002 URINALYSIS NONAUTO W/O SCOPE: CPT | Mod: PBBFAC

## 2022-12-14 RX ORDER — CALC/MAG/B COMPLEX/D3/HERB 61
15 TABLET ORAL DAILY
Qty: 30 CAPSULE | Refills: 1 | Status: SHIPPED | OUTPATIENT
Start: 2022-12-14

## 2022-12-14 NOTE — PROGRESS NOTES
Chief Complaint  Routine Prenatal Visit (33 weeks and 1 day) and Medication Refill      History of Present Illness      22 y.o. yo female  at 33^1 wga with MARIA EUGENIA of 23 consistent with 2nd trimester US.      Acute Issues: nausea, not currently taking any medicines. Current cannabis smoker. White, milky vaginal discharge.     Chronic Issues: nausea, depression  OB Review of Systems:  Fetal Movements: Yes  Vaginal bleeding: None  Leakage of Fluid: None   Vaginal Discharge: Yes see above  Headaches: None  Lower Extremity Edema: None  Vision Changes: None  Contractions: None          OB History    Para Term  AB Living   3 1 1 0 1 1   SAB IAB Ectopic Multiple Live Births   1 0 0 0 1      # Outcome Date GA Lbr Virgilio/2nd Weight Sex Delivery Anes PTL Lv   3 Current            2 SAB 10/2019 8w0d    SAB Gen  FD      Complications: History of D&C   1 Term 19 40w0d  3.204 kg (7 lb 1 oz) M Vag-Spont EPI N AALIYAH         ROS    Constitutional: no fever, no chills, no weight loss  CV: no swelling, no edema, no chest pain  : no urinary retention, no urinary incontinence, denies dysuria  GI: + nausea, no fecal incontinence, no constipation, no diarrhea, no vomiting  RESP: occasional SOB, no wheezing, no difficulty breathing  Psych: + depressed mood, no anxiety        Physical Exam    Vitals:    22 1045   BP: (!) 97/59   Pulse: 87   Resp: 18   Temp: 98.3 °F (36.8 °C)     Wt Readings from Last 2 Encounters:   22 56.7 kg (125 lb)   22 55.2 kg (121 lb 12.8 oz)     General:   RESP: clear to auscultation bilaterally, non labored  CV: regular rate and rhythm, no murmurs, no edema  ABD: gravid, nontender, Bs+  FHTs: 149 bpm; (location)  Fundal height: 31 cm  : Cervix closed, scant white milky vaginal discharge noted on exam glove      Initial OB Labs:  - Blood Type and Rh: O positive  - Antibody Screen: negative  - CBC H/H: 10.6/34.1  - HIV: Nonreactive  - RPR: Nonreactive  - GC: Negative  - CT:  Negative  - HBsAg: Nonreactive  - HCVAb: Nonreactive  - Rubella: Immune  - Varicella: Immune  - UA & Culture: No growth  - Sickle Cell Screen: Negative  - PAP: negative for lesion/malignancy, Candida present  - Influenza vaccine date: n/a    15-20 Weeks Lab   - Quad Screen: cell free DNA low risk    28 Week Lab - 12/14/22  - 1H GTT: pt drank juice, however vomited it shortly after she drank it. She will come tomorrow at 1000 for repeat test. Patient will also get rest of labs done tomorrow.   - Rhogam: not indicated  - Date of Tdap: declined  - CBC H/H: see above  - RPR: see above      Imaging:   US OB 14+ weeks : Single intrauterine pregnancy with a viable fetus fetal heart rate of 150 beats per minute.   Estimated age by ultrasound 22 weeks and 2 days +/-1 week 4 days.   Estimated date of delivery by ultrasound January 31, 2023    US MFM 11/17/22: A viable bella pregnancy is visualized in cephalic presentation. Estimated fetal weight is at the 24th percentile with an abdominal circumference at the 31st percentile.   No fetal abnormalities are noted and previous anatomic survey was normal. Mild polyhydramnios is noted. Placenta is posterior. Biophysical profile is 8/8.       Current Outpatient Medications  Current Outpatient Medications   Medication Instructions    aspirin (ECOTRIN) 81 mg, Oral, Daily    ELITE-OB 50 mg iron- 1.25 mg Tab 1 tablet, Oral, Daily    lansoprazole (PREVACID) 15 mg, Oral, Daily    prenatal vit 10-iron fum-folic 65-1 mg Tab 1 tablet, Oral, Daily    sertraline (ZOLOFT) 25 mg, Oral, Daily             Assessment / Plan:      1. 33 weeks gestation of pregnancy  - POCT URINE DIPSTICK WITHOUT MICROSCOPE  - CBC Auto Differential  - SYPHILIS ANTIBODY (WITH REFLEX RPR)  - Glucose Tolerance 1 Hour  -OB Protocol  -PNVs  -Urine dip reviewed, wnl  -Routine labs reviewed  -Mother plans on breast feeding  -Postpartum contraception: undecided  -Labor and ED precautions discussed in depth    -pt will  return on 12/15/22 for repeat 1hr GTT and CBC, RPR blood work as she vomited the glucose drink shortly after drinking it. Offered patient to get rest of labs done, however she prefers to come back tomorrow  to get it all done together. It was discussed with patient that if she cannot tolerate the Glucola, we'll have to prescribe a glucometer for her to self measure her sugars for 1 week. Pt understands.       2. Gastroesophageal reflux disease, unspecified whether esophagitis present  -nausea in 3rd trimester likely secondary to acid reflux  -pt did not have a chance to  Prevacid previously, repeat Rx sent again    - lansoprazole (PREVACID) 15 MG capsule; Take 1 capsule (15 mg total) by mouth once daily.  Dispense: 30 capsule; Refill: 1           Follow up:    In 2 weeks, or earlier if needed.     Grecia Wright M.D.  [facility] PGY-2

## 2022-12-17 ENCOUNTER — HOSPITAL ENCOUNTER (EMERGENCY)
Facility: HOSPITAL | Age: 22
Discharge: HOME | End: 2022-12-17
Attending: OBSTETRICS & GYNECOLOGY
Payer: MEDICAID

## 2022-12-17 VITALS
HEART RATE: 80 BPM | SYSTOLIC BLOOD PRESSURE: 109 MMHG | TEMPERATURE: 99 F | RESPIRATION RATE: 18 BRPM | DIASTOLIC BLOOD PRESSURE: 69 MMHG

## 2022-12-17 LAB
APPEARANCE UR: CLEAR
BACTERIA #/AREA URNS AUTO: NORMAL /HPF
BILIRUB UR QL STRIP.AUTO: NEGATIVE MG/DL
COLOR UR AUTO: YELLOW
GLUCOSE UR QL STRIP.AUTO: NEGATIVE MG/DL
KETONES UR QL STRIP.AUTO: ABNORMAL MG/DL
LEUKOCYTE ESTERASE UR QL STRIP.AUTO: NEGATIVE UNIT/L
NITRITE UR QL STRIP.AUTO: NEGATIVE
PH UR STRIP.AUTO: 8 [PH]
PROT UR QL STRIP.AUTO: ABNORMAL MG/DL
RBC #/AREA URNS AUTO: <5 /HPF
RBC UR QL AUTO: NEGATIVE UNIT/L
SP GR UR STRIP.AUTO: 1.01 (ref 1–1.03)
SQUAMOUS #/AREA URNS AUTO: <5 /HPF
UROBILINOGEN UR STRIP-ACNC: 1 MG/DL
WBC #/AREA URNS AUTO: <5 /HPF

## 2022-12-17 PROCEDURE — 25000003 PHARM REV CODE 250: Performed by: OBSTETRICS & GYNECOLOGY

## 2022-12-17 PROCEDURE — 81003 URINALYSIS AUTO W/O SCOPE: CPT | Performed by: OBSTETRICS & GYNECOLOGY

## 2022-12-17 PROCEDURE — 99285 EMERGENCY DEPT VISIT HI MDM: CPT | Mod: 25

## 2022-12-17 PROCEDURE — 81001 URINALYSIS AUTO W/SCOPE: CPT | Performed by: OBSTETRICS & GYNECOLOGY

## 2022-12-17 PROCEDURE — 63600175 PHARM REV CODE 636 W HCPCS: Performed by: OBSTETRICS & GYNECOLOGY

## 2022-12-17 RX ORDER — DEXTROSE, SODIUM CHLORIDE, SODIUM LACTATE, POTASSIUM CHLORIDE, AND CALCIUM CHLORIDE 5; .6; .31; .03; .02 G/100ML; G/100ML; G/100ML; G/100ML; G/100ML
INJECTION, SOLUTION INTRAVENOUS CONTINUOUS
Status: DISCONTINUED | OUTPATIENT
Start: 2022-12-17 | End: 2022-12-17 | Stop reason: HOSPADM

## 2022-12-17 RX ORDER — ACETAMINOPHEN 500 MG
1000 TABLET ORAL
Status: COMPLETED | OUTPATIENT
Start: 2022-12-17 | End: 2022-12-17

## 2022-12-17 RX ADMIN — ACETAMINOPHEN 1000 MG: 500 TABLET ORAL at 05:12

## 2022-12-17 RX ADMIN — SODIUM CHLORIDE, SODIUM LACTATE, POTASSIUM CHLORIDE, CALCIUM CHLORIDE AND DEXTROSE MONOHYDRATE: 5; 600; 310; 30; 20 INJECTION, SOLUTION INTRAVENOUS at 05:12

## 2022-12-17 NOTE — PROGRESS NOTES
12/17/22 1751   TeleSt[facility] Yvette Note - Strip   Strip Reviewed by Yvette Nurse? Yes   TeleSt[facility]vette Note - Communication   [place] Nurse Communicated with Bedside Nurse Regarding: Fetal Status   [facility]St[facility] Yvette Note - Notification   Nurse Notified? Yes   Name of Nurse Sierra
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
## 2022-12-28 DIAGNOSIS — Z36.2 ENCOUNTER FOR FOLLOW-UP ULTRASOUND OF FETAL ANATOMY: Primary | ICD-10-CM

## 2023-01-04 ENCOUNTER — PROCEDURE VISIT (OUTPATIENT)
Dept: MATERNAL FETAL MEDICINE | Facility: CLINIC | Age: 23
End: 2023-01-04
Payer: MEDICAID

## 2023-01-04 DIAGNOSIS — Z36.2 ENCOUNTER FOR FOLLOW-UP ULTRASOUND OF FETAL ANATOMY: ICD-10-CM

## 2023-01-04 PROCEDURE — 76816 US MFM PROCEDURE (VIEWPOINT): ICD-10-PCS | Mod: S$GLB,,, | Performed by: OBSTETRICS & GYNECOLOGY

## 2023-01-04 PROCEDURE — 76819 FETAL BIOPHYS PROFIL W/O NST: CPT | Mod: S$GLB,,, | Performed by: OBSTETRICS & GYNECOLOGY

## 2023-01-04 PROCEDURE — 76816 OB US FOLLOW-UP PER FETUS: CPT | Mod: S$GLB,,, | Performed by: OBSTETRICS & GYNECOLOGY

## 2023-01-04 PROCEDURE — 76819 US MFM PROCEDURE (VIEWPOINT): ICD-10-PCS | Mod: S$GLB,,, | Performed by: OBSTETRICS & GYNECOLOGY

## 2023-01-06 ENCOUNTER — OFFICE VISIT (OUTPATIENT)
Dept: FAMILY MEDICINE | Facility: CLINIC | Age: 23
End: 2023-01-06
Payer: MEDICAID

## 2023-01-06 VITALS
HEIGHT: 62 IN | TEMPERATURE: 98 F | OXYGEN SATURATION: 100 % | HEART RATE: 81 BPM | WEIGHT: 123 LBS | SYSTOLIC BLOOD PRESSURE: 110 MMHG | BODY MASS INDEX: 22.63 KG/M2 | RESPIRATION RATE: 18 BRPM | DIASTOLIC BLOOD PRESSURE: 69 MMHG

## 2023-01-06 DIAGNOSIS — K21.9 GASTROESOPHAGEAL REFLUX DISEASE, UNSPECIFIED WHETHER ESOPHAGITIS PRESENT: ICD-10-CM

## 2023-01-06 DIAGNOSIS — Z34.90 PREGNANCY, UNSPECIFIED GESTATIONAL AGE: Primary | ICD-10-CM

## 2023-01-06 LAB
BASOPHILS # BLD AUTO: 0.02 X10(3)/MCL (ref 0–0.2)
BASOPHILS NFR BLD AUTO: 0.5 %
BILIRUB SERPL-MCNC: NEGATIVE MG/DL
BLOOD URINE, POC: NEGATIVE
C TRACH DNA SPEC QL NAA+PROBE: NOT DETECTED
CLARITY, POC UA: CLEAR
COLOR, POC UA: NORMAL
EOSINOPHIL # BLD AUTO: 0.05 X10(3)/MCL (ref 0–0.9)
EOSINOPHIL NFR BLD AUTO: 1.2 %
ERYTHROCYTE [DISTWIDTH] IN BLOOD BY AUTOMATED COUNT: 16.3 % (ref 11–14.5)
GLUCOSE UR QL STRIP: NEGATIVE
HCT VFR BLD AUTO: 34.8 % (ref 37–47)
HGB BLD-MCNC: 10.1 GM/DL (ref 12–16)
HIV 1+2 AB+HIV1 P24 AG SERPL QL IA: NONREACTIVE
IMM GRANULOCYTES # BLD AUTO: 0.02 X10(3)/MCL (ref 0–0.04)
IMM GRANULOCYTES NFR BLD AUTO: 0.5 %
KETONES UR QL STRIP: NEGATIVE
LEUKOCYTE ESTERASE URINE, POC: NORMAL
LYMPHOCYTES # BLD AUTO: 1.34 X10(3)/MCL (ref 0.6–4.6)
LYMPHOCYTES NFR BLD AUTO: 31.8 %
MCH RBC QN AUTO: 22.4 PG
MCHC RBC AUTO-ENTMCNC: 29 MG/DL (ref 33–36)
MCV RBC AUTO: 77.3 FL (ref 80–94)
MONOCYTES # BLD AUTO: 0.44 X10(3)/MCL (ref 0.1–1.3)
MONOCYTES NFR BLD AUTO: 10.5 %
N GONORRHOEA DNA SPEC QL NAA+PROBE: NOT DETECTED
NEUTROPHILS # BLD AUTO: 2.34 X10(3)/MCL (ref 2.1–9.2)
NEUTROPHILS NFR BLD AUTO: 55.5 %
NITRITE, POC UA: NEGATIVE
NRBC BLD AUTO-RTO: 0 % (ref 0–1)
PH, POC UA: 7
PLATELET # BLD AUTO: 183 X10(3)/MCL (ref 140–371)
PMV BLD AUTO: 11.5 FL (ref 9.4–12.4)
PROTEIN, POC: NEGATIVE
RBC # BLD AUTO: 4.5 X10(6)/MCL (ref 4.2–5.4)
SPECIFIC GRAVITY, POC UA: 1.01
T PALLIDUM AB SER QL: NONREACTIVE
UROBILINOGEN, POC UA: 0.2
WBC # SPEC AUTO: 4.2 X10(3)/MCL (ref 4.5–11.5)

## 2023-01-06 PROCEDURE — 85025 COMPLETE CBC W/AUTO DIFF WBC: CPT

## 2023-01-06 PROCEDURE — 87591 N.GONORRHOEAE DNA AMP PROB: CPT

## 2023-01-06 PROCEDURE — 99214 OFFICE O/P EST MOD 30 MIN: CPT | Mod: PBBFAC

## 2023-01-06 PROCEDURE — 87389 HIV-1 AG W/HIV-1&-2 AB AG IA: CPT

## 2023-01-06 PROCEDURE — 86780 TREPONEMA PALLIDUM: CPT

## 2023-01-06 PROCEDURE — 87081 CULTURE SCREEN ONLY: CPT

## 2023-01-06 PROCEDURE — 36415 COLL VENOUS BLD VENIPUNCTURE: CPT

## 2023-01-06 PROCEDURE — 81002 URINALYSIS NONAUTO W/O SCOPE: CPT | Mod: PBBFAC

## 2023-01-06 NOTE — PROGRESS NOTES
[place] OFFICE VISIT NOTE  Meena Hurtado  [id number]  2023      Chief Complaint: OB follow up (36 weeks 3 days no c/o)      HPI    22 y.o. yo female  at 36^3 wga with MARIA EUGENIA of 23 consistent with 2nd trimester US here for a follow up visit.     Acute Issues: Not taking Prenatal vitamins daily. No complaints or concerns today.    Chronic Issues:   GERD:  On prevacid 15mg qD  Current cannabis smoker     Depression:  - started zoloft 25mg daily - pharmacy did not have it, did not pick it up; reports feeling slightly better but still feels like she needs pharmacotherapy.       Gestational History:   OB History    Para Term  AB Living   3 1 1 0 1 1   SAB IAB Ectopic Multiple Live Births   1 0 0 0 1      # Outcome Date GA Lbr Virgilio/2nd Weight Sex Delivery Anes PTL Lv   3 Current            2 SAB 10/2019 8w0d    SAB Gen  FD      Complications: History of D&C   1 Term 19 40w0d  3.204 kg (7 lb 1 oz) M Vag-Spont EPI N AALIYAH        Gyn History:    LMP: 3/15/2022  Age at menarche: 14 years  Menstrual hx: irregular, every other month, 4 pads/day, 5-6 days per period  History of birth control: Depo-Provera injections  History of STDs and/or Abnormal PAPs: chlamydia treated 2 months ago  Last PAP: 22 no lesions/malignancy  Sexually active with 1 male partner     Past Medical History: depression  Surgical History: D&C, eye surgery  Social History: UDS THC positive on 22. Denies any smoking or alcohol use.  Medications: Prenatal vitamins, ASA 81 mg, Prevacid 15 mg  Family History: heart disease in mother, heart disease and seizures in maternal grandmother  Allergies: NKDA    ROS:  OB Review of Systems:   Fetal Movements: Yes   Vaginal bleeding: None   Leakage of Fluid: None    Vaginal Discharge: None   Headaches: None   Lower Extremity Edema: None   Vision Changes: None   Contractions: None      Constitutional: no fever, no chills, no weight loss   CV: no swelling, no edema, no chest pain   :  no urinary retention, no urinary incontinence, denies dysuria   GI: no fecal incontinence, no constipation, no diarrhea, no nausea, no vomiting   RESP: no SOB, no wheezing, no difficulty breathing   Psych: no depression, no anxiety      Vitals:    01/06/23 1602   BP: 110/69   Pulse: 81   Resp: 18   Temp: 98.1 °F (36.7 °C)          PE:  General: in no acute distress   RESP: clear to auscultation bilaterally, non labored   CV: regular rate and rhythm, no murmurs, no edema   ABD: gravid, nontender, BS+   FHTs: 140 bpm   Fundal height: 36 cm      Ultrasound:  US OB 14+ weeks : Single intrauterine pregnancy with a viable fetus fetal heart rate of 150 beats per minute.   Estimated age by ultrasound 22 weeks and 2 days +/-1 week 4 days.   Estimated date of delivery by ultrasound January 31, 2023     US [facility] 11/17/22: A viable bella pregnancy is visualized in cephalic presentation. Estimated fetal weight is at the 24th percentile with an abdominal circumference at the 31st percentile.   No fetal abnormalities are noted and previous anatomic survey was normal. Mild polyhydramnios is noted. Placenta is posterior. Biophysical profile is 8/8.      US MFM 12/28/22:  A viable bella pregnancy is visualized in cephalic presentation. Estimated fetal weight is at the 17th percentile with an abdominal circumference at the 13th percentile.  No fetal abnormalities are noted and previous anatomic survey was normal. Mild polyhydramnios remains at 24.5cm. Placenta is posterior. Biophysical profile is 8/8.       Initial OB Labs:   - Blood Type and Rh: O positive  - Antibody Screen: negative  - CBC H/H: 10.6/34.1  - HIV: Nonreactive  - RPR: Nonreactive  - GC: Negative  - CT: Negative  - HBsAg: Nonreactive  - HCVAb: Nonreactive  - Rubella: Immune  - Varicella: Immune  - UA & Culture: No growth  - Sickle Cell Screen: Negative  - PAP: negative for lesion/malignancy, Candida present  - Influenza vaccine date: n/a     15-20 Weeks Lab   -  Quad Screen:  cell free DNA low risk     28 Week Lab:   ordered today 1/6/23  - 1H GTT: ___   - Rhogam: ___   - Date of Tdap: declined  - CBC H/H: ___   - RPR: ___      36 Week Lab ordered today 1/6/23  - CBC H/H: _   - RPR: __   - GBS Culture: _   - HIV: _   - GC: _      Urine Dip 1/6/23:  Lab Results   Component Value Date    COLORU Light Yellow 01/06/2023    SPECGRAV 1.015 01/06/2023    PHUR 7.0 01/06/2023    WBCUR trace 01/06/2023    NITRITE negative 01/06/2023    PROTEINPOC negative 01/06/2023    GLUCOSEUR negative 01/06/2023    KETONESU negative 01/06/2023    UROBILINOGEN 0.2 01/06/2023    BILIRUBINPOC negative 01/06/2023    RBCUR negative 01/06/2023       Current Medications:   Current Outpatient Medications   Medication Sig Dispense Refill    aspirin (ECOTRIN) 81 MG EC tablet Take 1 tablet (81 mg total) by mouth once daily. 90 tablet 0    ELITE-OB 50 mg iron- 1.25 mg Tab Take 1 tablet by mouth once daily.      lansoprazole (PREVACID) 15 MG capsule Take 1 capsule (15 mg total) by mouth once daily. 30 capsule 1    prenatal vit 10-iron fum-folic 65-1 mg Tab Take 1 tablet by mouth once daily. 90 tablet 0    sertraline (ZOLOFT) 25 MG tablet Take 1 tablet (25 mg total) by mouth once daily. 30 tablet 1     No current facility-administered medications for this visit.       Assessment:   1. Pregnancy, unspecified gestational age    2. Gastroesophageal reflux disease, unspecified whether esophagitis present        Plan:    Pregnancy   - OB Protocol   - Prenatal vitamins   - Urine dip: reviewed   - Routine (initial) labs: as above   - Mother plans on breast/bottle feeding   - Postpartum contraception discussion: undecided   - Labor precautions discussed in depth   - has not completed 1 H GTT, not able to complete today as clinic is closing and lab also closing.  Instructed patient to return next week for nurse visit.     GERD  -continue prevacid       Return to clinic in 1 week    Sravani Ayoub M.D.  [facility]  [facility]

## 2023-01-09 PROBLEM — B95.1 GROUP B STREPTOCOCCUS URINARY TRACT INFECTION AFFECTING PREGNANCY IN THIRD TRIMESTER: Status: ACTIVE | Noted: 2023-01-09

## 2023-01-09 PROBLEM — O23.43 GROUP B STREPTOCOCCUS URINARY TRACT INFECTION AFFECTING PREGNANCY IN THIRD TRIMESTER: Status: ACTIVE | Noted: 2023-01-09

## 2023-01-09 LAB — BACTERIA SPEC CULT: ABNORMAL

## 2023-01-12 DIAGNOSIS — Z36.89 ENCOUNTER FOR FETAL ANATOMIC SURVEY: Primary | ICD-10-CM

## 2023-01-18 ENCOUNTER — PROCEDURE VISIT (OUTPATIENT)
Dept: MATERNAL FETAL MEDICINE | Facility: CLINIC | Age: 23
End: 2023-01-18
Payer: MEDICAID

## 2023-01-18 VITALS — DIASTOLIC BLOOD PRESSURE: 42 MMHG | SYSTOLIC BLOOD PRESSURE: 84 MMHG | WEIGHT: 99.19 LBS | BODY MASS INDEX: 18.14 KG/M2

## 2023-01-18 DIAGNOSIS — Z36.89 ENCOUNTER FOR FETAL ANATOMIC SURVEY: ICD-10-CM

## 2023-01-18 PROCEDURE — 76819 FETAL BIOPHYS PROFIL W/O NST: CPT | Mod: S$GLB,,, | Performed by: OBSTETRICS & GYNECOLOGY

## 2023-01-18 PROCEDURE — 76819 US MFM PROCEDURE (VIEWPOINT): ICD-10-PCS | Mod: S$GLB,,, | Performed by: OBSTETRICS & GYNECOLOGY

## 2023-01-18 RX ORDER — DOXYLAMINE SUCCINATE AND PYRIDOXINE HYDROCHLORIDE 20; 20 MG/1; MG/1
TABLET, EXTENDED RELEASE ORAL 2 TIMES DAILY
Status: ON HOLD | COMMUNITY
End: 2023-01-30 | Stop reason: HOSPADM

## 2023-01-20 ENCOUNTER — OFFICE VISIT (OUTPATIENT)
Dept: FAMILY MEDICINE | Facility: CLINIC | Age: 23
End: 2023-01-20
Payer: MEDICAID

## 2023-01-20 VITALS
SYSTOLIC BLOOD PRESSURE: 103 MMHG | HEIGHT: 62 IN | HEART RATE: 66 BPM | TEMPERATURE: 98 F | RESPIRATION RATE: 16 BRPM | OXYGEN SATURATION: 100 % | WEIGHT: 120.63 LBS | BODY MASS INDEX: 22.2 KG/M2 | DIASTOLIC BLOOD PRESSURE: 66 MMHG

## 2023-01-20 DIAGNOSIS — B95.1 POSITIVE GBS TEST: ICD-10-CM

## 2023-01-20 DIAGNOSIS — Z3A.38 38 WEEKS GESTATION OF PREGNANCY: Primary | ICD-10-CM

## 2023-01-20 DIAGNOSIS — O40.3XX0 POLYHYDRAMNIOS IN THIRD TRIMESTER COMPLICATION, SINGLE OR UNSPECIFIED FETUS: ICD-10-CM

## 2023-01-20 LAB
BILIRUB SERPL-MCNC: NEGATIVE MG/DL
BLOOD URINE, POC: NEGATIVE
CLARITY, POC UA: CLEAR
COLOR, POC UA: YELLOW
GLUCOSE UR QL STRIP: NEGATIVE
KETONES UR QL STRIP: NEGATIVE
LEUKOCYTE ESTERASE URINE, POC: NORMAL
NITRITE, POC UA: NEGATIVE
PH, POC UA: 7
PROTEIN, POC: NORMAL
SPECIFIC GRAVITY, POC UA: 1.02
UROBILINOGEN, POC UA: 4

## 2023-01-20 PROCEDURE — 99214 OFFICE O/P EST MOD 30 MIN: CPT | Mod: PBBFAC

## 2023-01-20 PROCEDURE — 81002 URINALYSIS NONAUTO W/O SCOPE: CPT | Mod: PBBFAC

## 2023-01-20 NOTE — H&P (VIEW-ONLY)
[facility] Office Visit Note    CC:   Routine Prenatal Visit (Ob 38 weeks 1 days)       HPI:  22 y.o. yo female  at 38^1 WGA with MARIA EUGENIA of 23 consistent with 2nd trimester US here for a follow up visit.     Acute Issues:      Chronic Issues:   GERD:  On prevacid 15mg qD  Current cannabis smoker     Depression: Not on medication currently, stable     Polyhydramnios: Following w/ MFM   Last BPP 23 8/8, JOSEPH 24         Gestational History:                    OB History    Para Term  AB Living   3 1 1 0 1 1   SAB IAB Ectopic Multiple Live Births      1 0 0 0 1          # Outcome Date GA Lbr Virgilio/2nd Weight Sex Delivery Anes PTL Lv   3 Current                     2 SAB 10/2019 8w0d       SAB Gen   FD      Complications: History of D&C   1 Term 19 40w0d   3.204 kg (7 lb 1 oz) M Vag-Spont EPI N AALIYAH         Gyn History:    LMP: 3/15/2022  Age at menarche: 14 years  Menstrual hx: irregular, every other month, 4 pads/day, 5-6 days per period  History of birth control: Depo-Provera injections  History of STDs and/or Abnormal PAPs: chlamydia treated 2 months ago  Last PAP: 22 no lesions/malignancy  Sexually active with 1 male partner     Past Medical History: depression  Surgical History: D&C, eye surgery  Social History: UDS THC positive on 22. Denies any smoking or alcohol use.  Medications: Prenatal vitamins, ASA 81 mg, Prevacid 15 mg  Family History: heart disease in mother, heart disease and seizures in maternal grandmother  Allergies: NKDA     ROS:  OB Review of Systems:   Fetal Movements: Yes   Vaginal bleeding: None   Leakage of Fluid: None    Vaginal Discharge: None   Headaches: None   Lower Extremity Edema: None   Vision Changes: None   Contractions: None      Constitutional: no fever, no chills  CV: no swelling, no edema  : denies dysuria     Current Outpatient Medications   Medication Instructions    aspirin (ECOTRIN) 81 mg, Oral, Daily    doxylamine-pyridoxine, vit B6,  (BONJESTA) 20-20 mg TbID Oral, 2 times daily    ELITE-OB 50 mg iron- 1.25 mg Tab 1 tablet, Oral, Daily    lansoprazole (PREVACID) 15 mg, Oral, Daily    prenatal vit 10-iron fum-folic 65-1 mg Tab 1 tablet, Oral, Daily    sertraline (ZOLOFT) 25 mg, Oral, Daily        Physical Exam:   Vitals:    01/20/23 1535   BP: 103/66   Pulse: 66   Resp: 16   Temp: 98.1 °F (36.7 °C)      Physical Exam   General: in no acute distress   RESP: clear to auscultation bilaterally, non labored   CV: regular rate and rhythm, no murmurs, no edema   ABD: gravid, nontender, BS+   FHTs: 145bpm by doppler, right periumbilical   Fundal height: 38 cm   Cervix: 2cm/thick/-3      Ultrasound:  US OB 14+ weeks : Single intrauterine pregnancy with a viable fetus fetal heart rate of 150 beats per minute.   Estimated age by ultrasound 22 weeks and 2 days +/-1 week 4 days.   Estimated date of delivery by ultrasound January 31, 2023     US MFM 11/17/22: A viable bella pregnancy is visualized in cephalic presentation. Estimated fetal weight is at the 24th percentile with an abdominal circumference at the 31st percentile.   No fetal abnormalities are noted and previous anatomic survey was normal. Mild polyhydramnios is noted. Placenta is posterior. Biophysical profile is 8/8.       US MFM 12/28/22:  A viable bella pregnancy is visualized in cephalic presentation. Estimated fetal weight is at the 17th percentile with an abdominal circumference at the 13th percentile.  No fetal abnormalities are noted and previous anatomic survey was normal. Mild polyhydramnios remains at 24.5cm. Placenta is posterior. Biophysical profile is 8/8.       Initial OB Labs:   - Blood Type and Rh: O positive  - Antibody Screen: negative  - CBC H/H: 10.6/34.1  - HIV: Nonreactive  - RPR: Nonreactive  - GC: Negative  - CT: Negative  - HBsAg: Nonreactive  - HCVAb: Nonreactive  - Rubella: Immune  - Varicella: Immune  - UA & Culture: No growth  - Sickle Cell Screen: Negative  -  PAP: negative for lesion/malignancy, Candida present  - Influenza vaccine date: n/a     15-20 Weeks Lab   - Quad Screen:  cell free DNA low risk     28 Week Lab:   ordered today 1/6/23  - 1H GTT: She did not complete her 1hr   - Rhogam: N/A   - Date of Tdap: declined  - CBC H/H: 10/34  - RPR: NR      36 Week Lab ordered today 1/6/23  - CBC H/H: 10/34   - RPR: NR   - GBS Culture: +   - HIV: NR   - GC: NR     Assessment/Plan:  1. 38 weeks gestation of pregnancy    1. 38 weeks gestation of pregnancy  - OB Protocol   - Prenatal vitamins   - Urine dip: reviewed, WNL   - Routine (initial) labs: reviewed, as above   - Mother plans on breast/bottle feeding   - Postpartum contraception discussion: Unsure   - Labor and OB ED precautions discussed in depth   - She did not have her 1hr GTT, will defer given 38 week status. Discussed appropriate diet, continue to monitor     2. Positive GBS test  - Needs intrapartum prophy     3. Polyhydramnios in third trimester complication, single or unspecified fetus  - Following with MFM   - Last US 1/18/22 BPP 8/8, JOSEPH 24     Return to clinic in 1 week for follow up OB w/ NST     Saúl Meza M.D. [facility]

## 2023-01-20 NOTE — PROGRESS NOTES
[facility] Office Visit Note    CC:   Routine Prenatal Visit (Ob 38 weeks 1 days)       HPI:  22 y.o. yo female  at 38^1 WGA with MARIA EUGENIA of 23 consistent with 2nd trimester US here for a follow up visit.     Acute Issues:      Chronic Issues:   GERD:  On prevacid 15mg qD  Current cannabis smoker     Depression: Not on medication currently, stable     Polyhydramnios: Following w/ MFM   Last BPP 23 8/8, JOSEPH 24         Gestational History:                    OB History    Para Term  AB Living   3 1 1 0 1 1   SAB IAB Ectopic Multiple Live Births      1 0 0 0 1          # Outcome Date GA Lbr Virgilio/2nd Weight Sex Delivery Anes PTL Lv   3 Current                     2 SAB 10/2019 8w0d       SAB Gen   FD      Complications: History of D&C   1 Term 19 40w0d   3.204 kg (7 lb 1 oz) M Vag-Spont EPI N AALIYAH         Gyn History:    LMP: 3/15/2022  Age at menarche: 14 years  Menstrual hx: irregular, every other month, 4 pads/day, 5-6 days per period  History of birth control: Depo-Provera injections  History of STDs and/or Abnormal PAPs: chlamydia treated 2 months ago  Last PAP: 22 no lesions/malignancy  Sexually active with 1 male partner     Past Medical History: depression  Surgical History: D&C, eye surgery  Social History: UDS THC positive on 22. Denies any smoking or alcohol use.  Medications: Prenatal vitamins, ASA 81 mg, Prevacid 15 mg  Family History: heart disease in mother, heart disease and seizures in maternal grandmother  Allergies: NKDA     ROS:  OB Review of Systems:   Fetal Movements: Yes   Vaginal bleeding: None   Leakage of Fluid: None    Vaginal Discharge: None   Headaches: None   Lower Extremity Edema: None   Vision Changes: None   Contractions: None      Constitutional: no fever, no chills  CV: no swelling, no edema  : denies dysuria     Current Outpatient Medications   Medication Instructions    aspirin (ECOTRIN) 81 mg, Oral, Daily    doxylamine-pyridoxine, vit B6,  (BONJESTA) 20-20 mg TbID Oral, 2 times daily    ELITE-OB 50 mg iron- 1.25 mg Tab 1 tablet, Oral, Daily    lansoprazole (PREVACID) 15 mg, Oral, Daily    prenatal vit 10-iron fum-folic 65-1 mg Tab 1 tablet, Oral, Daily    sertraline (ZOLOFT) 25 mg, Oral, Daily        Physical Exam:   Vitals:    01/20/23 1535   BP: 103/66   Pulse: 66   Resp: 16   Temp: 98.1 °F (36.7 °C)      Physical Exam   General: in no acute distress   RESP: clear to auscultation bilaterally, non labored   CV: regular rate and rhythm, no murmurs, no edema   ABD: gravid, nontender, BS+   FHTs: 145bpm by doppler, right periumbilical   Fundal height: 38 cm   Cervix: 2cm/thick/-3      Ultrasound:  US OB 14+ weeks : Single intrauterine pregnancy with a viable fetus fetal heart rate of 150 beats per minute.   Estimated age by ultrasound 22 weeks and 2 days +/-1 week 4 days.   Estimated date of delivery by ultrasound January 31, 2023     US MFM 11/17/22: A viable bella pregnancy is visualized in cephalic presentation. Estimated fetal weight is at the 24th percentile with an abdominal circumference at the 31st percentile.   No fetal abnormalities are noted and previous anatomic survey was normal. Mild polyhydramnios is noted. Placenta is posterior. Biophysical profile is 8/8.       US MFM 12/28/22:  A viable bella pregnancy is visualized in cephalic presentation. Estimated fetal weight is at the 17th percentile with an abdominal circumference at the 13th percentile.  No fetal abnormalities are noted and previous anatomic survey was normal. Mild polyhydramnios remains at 24.5cm. Placenta is posterior. Biophysical profile is 8/8.       Initial OB Labs:   - Blood Type and Rh: O positive  - Antibody Screen: negative  - CBC H/H: 10.6/34.1  - HIV: Nonreactive  - RPR: Nonreactive  - GC: Negative  - CT: Negative  - HBsAg: Nonreactive  - HCVAb: Nonreactive  - Rubella: Immune  - Varicella: Immune  - UA & Culture: No growth  - Sickle Cell Screen: Negative  -  PAP: negative for lesion/malignancy, Candida present  - Influenza vaccine date: n/a     15-20 Weeks Lab   - Quad Screen:  cell free DNA low risk     28 Week Lab:   ordered today 1/6/23  - 1H GTT: She did not complete her 1hr   - Rhogam: N/A   - Date of Tdap: declined  - CBC H/H: 10/34  - RPR: NR      36 Week Lab ordered today 1/6/23  - CBC H/H: 10/34   - RPR: NR   - GBS Culture: +   - HIV: NR   - GC: NR     Assessment/Plan:  1. 38 weeks gestation of pregnancy    1. 38 weeks gestation of pregnancy  - OB Protocol   - Prenatal vitamins   - Urine dip: reviewed, WNL   - Routine (initial) labs: reviewed, as above   - Mother plans on breast/bottle feeding   - Postpartum contraception discussion: Unsure   - Labor and OB ED precautions discussed in depth   - She did not have her 1hr GTT, will defer given 38 week status. Discussed appropriate diet, continue to monitor     2. Positive GBS test  - Needs intrapartum prophy     3. Polyhydramnios in third trimester complication, single or unspecified fetus  - Following with MFM   - Last US 1/18/22 BPP 8/8, JOSEPH 24     Return to clinic in 1 week for follow up OB w/ NST     Saúl Meza M.D. [facility]

## 2023-01-29 ENCOUNTER — ANESTHESIA EVENT (OUTPATIENT)
Dept: OBSTETRICS AND GYNECOLOGY | Facility: HOSPITAL | Age: 23
End: 2023-01-29
Payer: MEDICAID

## 2023-01-29 ENCOUNTER — HOSPITAL ENCOUNTER (INPATIENT)
Facility: HOSPITAL | Age: 23
LOS: 1 days | Discharge: HOME | End: 2023-01-30
Attending: OBSTETRICS & GYNECOLOGY | Admitting: OBSTETRICS & GYNECOLOGY
Payer: MEDICAID

## 2023-01-29 ENCOUNTER — ANESTHESIA (OUTPATIENT)
Dept: OBSTETRICS AND GYNECOLOGY | Facility: HOSPITAL | Age: 23
End: 2023-01-29
Payer: MEDICAID

## 2023-01-29 DIAGNOSIS — O47.9 UTERINE CONTRACTIONS: ICD-10-CM

## 2023-01-29 LAB
ABO AND RH: NORMAL
ANTIBODY SCREEN: NORMAL

## 2023-01-29 PROCEDURE — 72100003 HC LABOR CARE, EA. ADDL. 8 HRS

## 2023-01-29 PROCEDURE — 59409 OBSTETRICAL CARE: CPT | Mod: QZ,,, | Performed by: NURSE ANESTHETIST, CERTIFIED REGISTERED

## 2023-01-29 PROCEDURE — 59409 PRA ETRICAL CARE,VAG DELIV ONLY: ICD-10-PCS | Mod: QZ,,, | Performed by: NURSE ANESTHETIST, CERTIFIED REGISTERED

## 2023-01-29 PROCEDURE — 25000003 PHARM REV CODE 250: Performed by: NURSE ANESTHETIST, CERTIFIED REGISTERED

## 2023-01-29 PROCEDURE — 72100002 HC LABOR CARE, 1ST 8 HOURS

## 2023-01-29 PROCEDURE — 11000001 HC ACUTE MED/SURG PRIVATE ROOM

## 2023-01-29 PROCEDURE — 59409 PR OBSTETRICAL CARE,VAG DELIV ONLY: ICD-10-PCS | Mod: GB,,, | Performed by: OBSTETRICS & GYNECOLOGY

## 2023-01-29 PROCEDURE — 72200005 HC VAGINAL DELIVERY LEVEL II

## 2023-01-29 PROCEDURE — 63600175 PHARM REV CODE 636 W HCPCS: Performed by: NURSE ANESTHETIST, CERTIFIED REGISTERED

## 2023-01-29 PROCEDURE — 63600175 PHARM REV CODE 636 W HCPCS: Performed by: OBSTETRICS & GYNECOLOGY

## 2023-01-29 PROCEDURE — 86900 BLOOD TYPING SEROLOGIC ABO: CPT | Performed by: OBSTETRICS & GYNECOLOGY

## 2023-01-29 PROCEDURE — 62326 NJX INTERLAMINAR LMBR/SAC: CPT | Performed by: NURSE ANESTHETIST, CERTIFIED REGISTERED

## 2023-01-29 PROCEDURE — 51702 INSERT TEMP BLADDER CATH: CPT

## 2023-01-29 PROCEDURE — 36415 COLL VENOUS BLD VENIPUNCTURE: CPT | Performed by: OBSTETRICS & GYNECOLOGY

## 2023-01-29 PROCEDURE — 59409 OBSTETRICAL CARE: CPT | Mod: GB,,, | Performed by: OBSTETRICS & GYNECOLOGY

## 2023-01-29 PROCEDURE — 25000003 PHARM REV CODE 250: Performed by: OBSTETRICS & GYNECOLOGY

## 2023-01-29 RX ORDER — ACETAMINOPHEN 325 MG/1
650 TABLET ORAL EVERY 6 HOURS PRN
Status: DISCONTINUED | OUTPATIENT
Start: 2023-01-29 | End: 2023-01-30 | Stop reason: HOSPADM

## 2023-01-29 RX ORDER — SODIUM CHLORIDE 9 MG/ML
INJECTION, SOLUTION INTRAVENOUS
Status: DISCONTINUED | OUTPATIENT
Start: 2023-01-29 | End: 2023-01-30

## 2023-01-29 RX ORDER — OXYTOCIN/RINGER'S LACTATE 30/500 ML
0-30 PLASTIC BAG, INJECTION (ML) INTRAVENOUS CONTINUOUS
Status: DISCONTINUED | OUTPATIENT
Start: 2023-01-29 | End: 2023-01-30 | Stop reason: HOSPADM

## 2023-01-29 RX ORDER — ONDANSETRON 4 MG/1
8 TABLET, ORALLY DISINTEGRATING ORAL EVERY 8 HOURS PRN
Status: DISCONTINUED | OUTPATIENT
Start: 2023-01-29 | End: 2023-01-30

## 2023-01-29 RX ORDER — HYDROCODONE BITARTRATE AND ACETAMINOPHEN 5; 325 MG/1; MG/1
1 TABLET ORAL EVERY 4 HOURS PRN
Status: DISCONTINUED | OUTPATIENT
Start: 2023-01-29 | End: 2023-01-30 | Stop reason: HOSPADM

## 2023-01-29 RX ORDER — CARBOPROST TROMETHAMINE 250 UG/ML
250 INJECTION, SOLUTION INTRAMUSCULAR
Status: DISCONTINUED | OUTPATIENT
Start: 2023-01-29 | End: 2023-01-30 | Stop reason: HOSPADM

## 2023-01-29 RX ORDER — OXYTOCIN 10 [USP'U]/ML
10 INJECTION, SOLUTION INTRAMUSCULAR; INTRAVENOUS ONCE AS NEEDED
Status: DISCONTINUED | OUTPATIENT
Start: 2023-01-29 | End: 2023-01-30 | Stop reason: HOSPADM

## 2023-01-29 RX ORDER — SIMETHICONE 80 MG
1 TABLET,CHEWABLE ORAL EVERY 6 HOURS PRN
Status: DISCONTINUED | OUTPATIENT
Start: 2023-01-29 | End: 2023-01-30 | Stop reason: HOSPADM

## 2023-01-29 RX ORDER — ACETAMINOPHEN 325 MG/1
650 TABLET ORAL EVERY 6 HOURS PRN
Status: DISCONTINUED | OUTPATIENT
Start: 2023-01-29 | End: 2023-01-30

## 2023-01-29 RX ORDER — SIMETHICONE 80 MG
1 TABLET,CHEWABLE ORAL 4 TIMES DAILY PRN
Status: DISCONTINUED | OUTPATIENT
Start: 2023-01-29 | End: 2023-01-29 | Stop reason: SDUPTHER

## 2023-01-29 RX ORDER — PRENATAL WITH FERROUS FUM AND FOLIC ACID 3080; 920; 120; 400; 22; 1.84; 3; 20; 10; 1; 12; 200; 27; 25; 2 [IU]/1; [IU]/1; MG/1; [IU]/1; MG/1; MG/1; MG/1; MG/1; MG/1; MG/1; UG/1; MG/1; MG/1; MG/1; MG/1
1 TABLET ORAL DAILY
Status: DISCONTINUED | OUTPATIENT
Start: 2023-01-30 | End: 2023-01-30 | Stop reason: HOSPADM

## 2023-01-29 RX ORDER — MISOPROSTOL 100 UG/1
800 TABLET ORAL ONCE AS NEEDED
Status: DISCONTINUED | OUTPATIENT
Start: 2023-01-29 | End: 2023-01-29 | Stop reason: SDUPTHER

## 2023-01-29 RX ORDER — IBUPROFEN 600 MG/1
600 TABLET ORAL EVERY 6 HOURS PRN
Status: DISCONTINUED | OUTPATIENT
Start: 2023-01-29 | End: 2023-01-30 | Stop reason: HOSPADM

## 2023-01-29 RX ORDER — TRANEXAMIC ACID 10 MG/ML
1000 INJECTION, SOLUTION INTRAVENOUS ONCE AS NEEDED
Status: DISCONTINUED | OUTPATIENT
Start: 2023-01-29 | End: 2023-01-30 | Stop reason: HOSPADM

## 2023-01-29 RX ORDER — ROPIVACAINE HYDROCHLORIDE 2 MG/ML
INJECTION, SOLUTION EPIDURAL; INFILTRATION CONTINUOUS PRN
Status: DISCONTINUED | OUTPATIENT
Start: 2023-01-29 | End: 2023-01-29 | Stop reason: HOSPADM

## 2023-01-29 RX ORDER — METHYLERGONOVINE MALEATE 0.2 MG/ML
200 INJECTION INTRAVENOUS
Status: DISCONTINUED | OUTPATIENT
Start: 2023-01-29 | End: 2023-01-29 | Stop reason: SDUPTHER

## 2023-01-29 RX ORDER — ONDANSETRON 4 MG/1
8 TABLET, ORALLY DISINTEGRATING ORAL EVERY 8 HOURS PRN
Status: DISCONTINUED | OUTPATIENT
Start: 2023-01-29 | End: 2023-01-29 | Stop reason: SDUPTHER

## 2023-01-29 RX ORDER — LIDOCAINE HCL/EPINEPHRINE/PF 2%-1:200K
VIAL (ML) INJECTION
Status: DISCONTINUED | OUTPATIENT
Start: 2023-01-29 | End: 2023-01-29 | Stop reason: HOSPADM

## 2023-01-29 RX ORDER — OXYTOCIN/RINGER'S LACTATE 30/500 ML
334 PLASTIC BAG, INJECTION (ML) INTRAVENOUS ONCE
Status: DISCONTINUED | OUTPATIENT
Start: 2023-01-29 | End: 2023-01-30

## 2023-01-29 RX ORDER — OXYTOCIN/RINGER'S LACTATE 30/500 ML
95 PLASTIC BAG, INJECTION (ML) INTRAVENOUS ONCE AS NEEDED
Status: DISCONTINUED | OUTPATIENT
Start: 2023-01-29 | End: 2023-01-30

## 2023-01-29 RX ORDER — CARBOPROST TROMETHAMINE 250 UG/ML
250 INJECTION, SOLUTION INTRAMUSCULAR
Status: DISCONTINUED | OUTPATIENT
Start: 2023-01-29 | End: 2023-01-29 | Stop reason: SDUPTHER

## 2023-01-29 RX ORDER — METHYLERGONOVINE MALEATE 0.2 MG/ML
200 INJECTION INTRAVENOUS
Status: DISCONTINUED | OUTPATIENT
Start: 2023-01-29 | End: 2023-01-30

## 2023-01-29 RX ORDER — OXYTOCIN/RINGER'S LACTATE 30/500 ML
95 PLASTIC BAG, INJECTION (ML) INTRAVENOUS ONCE
Status: DISCONTINUED | OUTPATIENT
Start: 2023-01-29 | End: 2023-01-30 | Stop reason: HOSPADM

## 2023-01-29 RX ORDER — CARBOPROST TROMETHAMINE 250 UG/ML
250 INJECTION, SOLUTION INTRAMUSCULAR
Status: DISCONTINUED | OUTPATIENT
Start: 2023-01-29 | End: 2023-01-30

## 2023-01-29 RX ORDER — MISOPROSTOL 100 UG/1
800 TABLET ORAL ONCE AS NEEDED
Status: DISCONTINUED | OUTPATIENT
Start: 2023-01-29 | End: 2023-01-30 | Stop reason: HOSPADM

## 2023-01-29 RX ORDER — METHYLERGONOVINE MALEATE 0.2 MG/ML
200 INJECTION INTRAVENOUS
Status: DISCONTINUED | OUTPATIENT
Start: 2023-01-29 | End: 2023-01-30 | Stop reason: HOSPADM

## 2023-01-29 RX ORDER — DIPHENOXYLATE HYDROCHLORIDE AND ATROPINE SULFATE 2.5; .025 MG/1; MG/1
1 TABLET ORAL 4 TIMES DAILY PRN
Status: DISCONTINUED | OUTPATIENT
Start: 2023-01-29 | End: 2023-01-30

## 2023-01-29 RX ORDER — DIPHENHYDRAMINE HCL 25 MG
25 CAPSULE ORAL EVERY 4 HOURS PRN
Status: DISCONTINUED | OUTPATIENT
Start: 2023-01-29 | End: 2023-01-30 | Stop reason: HOSPADM

## 2023-01-29 RX ORDER — PROCHLORPERAZINE EDISYLATE 5 MG/ML
5 INJECTION INTRAMUSCULAR; INTRAVENOUS EVERY 6 HOURS PRN
Status: DISCONTINUED | OUTPATIENT
Start: 2023-01-29 | End: 2023-01-30

## 2023-01-29 RX ORDER — ROPIVACAINE HYDROCHLORIDE 2 MG/ML
INJECTION, SOLUTION EPIDURAL; INFILTRATION
Status: COMPLETED
Start: 2023-01-29 | End: 2023-01-29

## 2023-01-29 RX ORDER — OXYTOCIN/RINGER'S LACTATE 30/500 ML
334 PLASTIC BAG, INJECTION (ML) INTRAVENOUS ONCE AS NEEDED
Status: DISCONTINUED | OUTPATIENT
Start: 2023-01-29 | End: 2023-01-30

## 2023-01-29 RX ORDER — TRANEXAMIC ACID 10 MG/ML
1000 INJECTION, SOLUTION INTRAVENOUS ONCE AS NEEDED
Status: DISCONTINUED | OUTPATIENT
Start: 2023-01-29 | End: 2023-01-29 | Stop reason: SDUPTHER

## 2023-01-29 RX ORDER — OXYTOCIN/RINGER'S LACTATE 30/500 ML
95 PLASTIC BAG, INJECTION (ML) INTRAVENOUS ONCE AS NEEDED
Status: DISCONTINUED | OUTPATIENT
Start: 2023-01-29 | End: 2023-01-30 | Stop reason: HOSPADM

## 2023-01-29 RX ORDER — LIDOCAINE HYDROCHLORIDE AND EPINEPHRINE 15; 5 MG/ML; UG/ML
INJECTION, SOLUTION EPIDURAL
Status: DISCONTINUED | OUTPATIENT
Start: 2023-01-29 | End: 2023-01-29

## 2023-01-29 RX ORDER — SODIUM CHLORIDE, SODIUM LACTATE, POTASSIUM CHLORIDE, CALCIUM CHLORIDE 600; 310; 30; 20 MG/100ML; MG/100ML; MG/100ML; MG/100ML
INJECTION, SOLUTION INTRAVENOUS CONTINUOUS
Status: DISCONTINUED | OUTPATIENT
Start: 2023-01-29 | End: 2023-01-30

## 2023-01-29 RX ORDER — LIDOCAINE HCL/EPINEPHRINE/PF 2%-1:200K
VIAL (ML) INJECTION
Status: COMPLETED
Start: 2023-01-29 | End: 2023-01-29

## 2023-01-29 RX ORDER — CALCIUM CARBONATE 200(500)MG
500 TABLET,CHEWABLE ORAL 3 TIMES DAILY PRN
Status: DISCONTINUED | OUTPATIENT
Start: 2023-01-29 | End: 2023-01-30

## 2023-01-29 RX ORDER — LIDOCAINE HYDROCHLORIDE 10 MG/ML
10 INJECTION INFILTRATION; PERINEURAL ONCE AS NEEDED
Status: DISCONTINUED | OUTPATIENT
Start: 2023-01-29 | End: 2023-01-30

## 2023-01-29 RX ORDER — HYDROCORTISONE 25 MG/G
CREAM TOPICAL 3 TIMES DAILY PRN
Status: DISCONTINUED | OUTPATIENT
Start: 2023-01-29 | End: 2023-01-30 | Stop reason: HOSPADM

## 2023-01-29 RX ORDER — SODIUM CHLORIDE 0.9 % (FLUSH) 0.9 %
10 SYRINGE (ML) INJECTION
Status: DISCONTINUED | OUTPATIENT
Start: 2023-01-29 | End: 2023-01-30 | Stop reason: HOSPADM

## 2023-01-29 RX ORDER — DIPHENHYDRAMINE HYDROCHLORIDE 50 MG/ML
25 INJECTION INTRAMUSCULAR; INTRAVENOUS EVERY 4 HOURS PRN
Status: DISCONTINUED | OUTPATIENT
Start: 2023-01-29 | End: 2023-01-30 | Stop reason: HOSPADM

## 2023-01-29 RX ORDER — DOCUSATE SODIUM 100 MG/1
200 CAPSULE, LIQUID FILLED ORAL 2 TIMES DAILY PRN
Status: DISCONTINUED | OUTPATIENT
Start: 2023-01-29 | End: 2023-01-30 | Stop reason: HOSPADM

## 2023-01-29 RX ORDER — OXYTOCIN/RINGER'S LACTATE 30/500 ML
334 PLASTIC BAG, INJECTION (ML) INTRAVENOUS ONCE AS NEEDED
Status: DISCONTINUED | OUTPATIENT
Start: 2023-01-29 | End: 2023-01-30 | Stop reason: HOSPADM

## 2023-01-29 RX ORDER — OXYTOCIN/RINGER'S LACTATE 30/500 ML
95 PLASTIC BAG, INJECTION (ML) INTRAVENOUS ONCE
Status: DISCONTINUED | OUTPATIENT
Start: 2023-01-29 | End: 2023-01-30

## 2023-01-29 RX ORDER — HYDROCODONE BITARTRATE AND ACETAMINOPHEN 7.5; 325 MG/1; MG/1
1 TABLET ORAL EVERY 4 HOURS PRN
Status: DISCONTINUED | OUTPATIENT
Start: 2023-01-29 | End: 2023-01-30 | Stop reason: HOSPADM

## 2023-01-29 RX ORDER — MISOPROSTOL 100 UG/1
800 TABLET ORAL
Status: DISCONTINUED | OUTPATIENT
Start: 2023-01-29 | End: 2023-01-30

## 2023-01-29 RX ORDER — MUPIROCIN 20 MG/G
OINTMENT TOPICAL 2 TIMES DAILY
Status: DISCONTINUED | OUTPATIENT
Start: 2023-01-29 | End: 2023-01-30 | Stop reason: HOSPADM

## 2023-01-29 RX ORDER — BUTORPHANOL TARTRATE 1 MG/ML
1 INJECTION INTRAMUSCULAR; INTRAVENOUS
Status: DISCONTINUED | OUTPATIENT
Start: 2023-01-29 | End: 2023-01-30

## 2023-01-29 RX ORDER — OXYTOCIN 10 [USP'U]/ML
10 INJECTION, SOLUTION INTRAMUSCULAR; INTRAVENOUS ONCE AS NEEDED
Status: DISCONTINUED | OUTPATIENT
Start: 2023-01-29 | End: 2023-01-30

## 2023-01-29 RX ADMIN — SODIUM CHLORIDE, POTASSIUM CHLORIDE, SODIUM LACTATE AND CALCIUM CHLORIDE 1000 ML: 600; 310; 30; 20 INJECTION, SOLUTION INTRAVENOUS at 03:01

## 2023-01-29 RX ADMIN — BUTORPHANOL TARTRATE 1 MG: 1 INJECTION, SOLUTION INTRAMUSCULAR; INTRAVENOUS at 03:01

## 2023-01-29 RX ADMIN — LIDOCAINE HYDROCHLORIDE AND EPINEPHRINE 3 MG: 20; 5 INJECTION, SOLUTION EPIDURAL; INFILTRATION; INTRACAUDAL; PERINEURAL at 06:01

## 2023-01-29 RX ADMIN — ROPIVACAINE HYDROCHLORIDE 9 ML/HR: 2 INJECTION, SOLUTION EPIDURAL; INFILTRATION at 07:01

## 2023-01-29 RX ADMIN — IBUPROFEN 600 MG: 600 TABLET, FILM COATED ORAL at 05:01

## 2023-01-29 RX ADMIN — DEXTROSE MONOHYDRATE 2.5 MILLION UNITS: 50 INJECTION, SOLUTION INTRAVENOUS at 09:01

## 2023-01-29 RX ADMIN — LIDOCAINE HYDROCHLORIDE AND EPINEPHRINE 3 MG: 20; 5 INJECTION, SOLUTION EPIDURAL; INFILTRATION; INTRACAUDAL; PERINEURAL at 07:01

## 2023-01-29 RX ADMIN — DEXTROSE MONOHYDRATE 5 MILLION UNITS: 50 INJECTION, SOLUTION INTRAVENOUS at 03:01

## 2023-01-29 RX ADMIN — SODIUM CHLORIDE, POTASSIUM CHLORIDE, SODIUM LACTATE AND CALCIUM CHLORIDE: 600; 310; 30; 20 INJECTION, SOLUTION INTRAVENOUS at 06:01

## 2023-01-29 RX ADMIN — HYDROCODONE BITARTRATE AND ACETAMINOPHEN 1 TABLET: 5; 325 TABLET ORAL at 05:01

## 2023-01-29 RX ADMIN — Medication 4 MILLI-UNITS/MIN: at 11:01

## 2023-01-29 RX ADMIN — DEXTROSE MONOHYDRATE 2.5 MILLION UNITS: 50 INJECTION, SOLUTION INTRAVENOUS at 02:01

## 2023-01-29 RX ADMIN — SODIUM CHLORIDE, POTASSIUM CHLORIDE, SODIUM LACTATE AND CALCIUM CHLORIDE: 600; 310; 30; 20 INJECTION, SOLUTION INTRAVENOUS at 04:01

## 2023-01-29 RX ADMIN — DEXTROSE MONOHYDRATE 3 MILLION UNITS: 50 INJECTION, SOLUTION INTRAVENOUS at 07:01

## 2023-01-29 NOTE — INTERVAL H&P NOTE
The patient has been examined and the H&P has been reviewed:    I concur with the findings and changes have been noted since the H&P was written:      The patient is 22-year-old  3 para 1 AB1 admitted at 39 weeks and 3 days the labor unit at [facility], complaining of labor pains and contractions.  She arrived via ambulance.  She was noted by the nurses to be 3 cm dilated on arrival and shortly after arrival had spontaneous rupture of membranes.  She is GBS positive and antibiotic prophylaxis has been started has been started, she is O positive.        There are no hospital problems to display for this patient.

## 2023-01-29 NOTE — ANESTHESIA PREPROCEDURE EVALUATION
01/29/2023  Meena Hurtado is a 22 y.o., female.      Pre-op Assessment    I have reviewed the Patient Summary Reports.     I have reviewed the Nursing Notes. I have reviewed the NPO Status.   I have reviewed the Medications.     Review of Systems  Anesthesia Hx:  No problems with previous Anesthesia  Denies Family Hx of Anesthesia complications.   Denies Personal Hx of Anesthesia complications.   Hematology/Oncology:  Hematology Normal   Oncology Normal     EENT/Dental:EENT/Dental Normal   Cardiovascular:  Cardiovascular Normal Exercise tolerance: good     Pulmonary:  Pulmonary Normal    Renal/:  Renal/ Normal     Hepatic/GI:  Hepatic/GI Normal    Musculoskeletal:  Musculoskeletal Normal    Neurological:  Neurology Normal    Endocrine:  Endocrine Normal    Dermatological:  Skin Normal    Psych:  Psychiatric Normal              Anesthesia Plan  Type of Anesthesia, risks & benefits discussed:    Anesthesia Type: Epidural  Intra-op Monitoring Plan: Standard ASA Monitors  Post Op Pain Control Plan: epidural analgesia  Informed Consent: Informed consent signed with the Patient and all parties understand the risks and agree with anesthesia plan.  All questions answered. Patient consented to blood products? Yes  ASA Score: 2  Day of Surgery Review of History & Physical: H&P Update referred to the surgeon/provider.I have interviewed and examined the patient. I have reviewed the patient's H&P dated: There are no significant changes.     Ready For Surgery From Anesthesia Perspective.     .

## 2023-01-29 NOTE — L&D DELIVERY NOTE
"[facility]Labor [facility] Delivery  Vaginal Delivery   Operative Note    SUMMARY     Normal spontaneous vaginal delivery of live infant, was placed on mothers abdomen for skin to skin and bulb suctioning performed.  Infant delivered position KEISHA over intact perineum.  Nuchal cord: No.  Spontaneous delivery of placenta and IV pitocin given and uterine massage, noting good uterine tone.  1st degree laceration noted, bilateral nella-urethral, hemostatic and not repaired,  Patient tolerated delivery well. Sponge needle and lap counted correctly x2.    Indications: Vaginal delivery  Pregnancy complicated by:   Patient Active Problem List   Diagnosis    Abdominal cramping affecting pregnancy    Gastroesophageal reflux disease    Depression during pregnancy, antepartum    Group B Streptococcus urinary tract infection affecting pregnancy in third trimester    Vaginal delivery     Admitting GA: 39w3d    Delivery Information for Milton Hurtado    Birth information:  YOB: 2021   Time of birth: 3:01 PM   Sex: male   Head Delivery Date/Time: 1/29/2023  3:01 PM   Delivery type: Vaginal, Spontaneous   Gestational Age: 39w3d    Delivery Providers    Delivering clinician: Kavon Rocha MD           Measurements    Weight: 3430 g  Weight (lbs): 7 lb 9 oz  Length: 50.8 cm  Length (in): 20"  Head circumference: 13 cm         Apgars    Living status: Living  Apgars:  1 min.:  5 min.:  10 min.:  15 min.:  20 min.:    Skin color:  0  1  1      Heart rate:  2  2  2      Reflex irritability:  2  2  2      Muscle tone:  2  2  2      Respiratory effort:  1  1  1      Total:  7  8  8      Apgars assigned by: ESPERANZA RMASEY RN         Operative Delivery    Forceps attempted?: No  Vacuum extractor attempted?: No         Shoulder Dystocia    Shoulder dystocia present?: No           Presentation    Presentation: Vertex  Position: Right Occiput Anterior           Interventions/Resuscitation    Method: Bulb Suctioning, Tactile " Stimulation       Cord    Vessels: 3 vessels  Complications: None  Delayed Cord Clamping?: Yes  Cord Clamped Date/Time: 2023  3:03 PM  Cord Blood Disposition: Lab  Stem Cell Collection (by MD): No       Placenta    Placenta delivery date/time: 2023 1504  Placenta removal: Spontaneous  Placenta appearance: Intact  Placenta disposition: discarded           Labor Events:       labor: No     Labor Onset Date/Time: 2023 03:35     Dilation Complete Date/Time: 2023 14:40     Start Pushing Date/Time: 2023 14:45     Rupture Date/Time: 23  0335         Rupture type: SRM (Spontaneous Rupture)           Fluid Amount:         Fluid Color: Clear         steroids: None     Antibiotics given for GBS: Yes     Induction:       Indications for induction:        Augmentation: oxytocin     Indications for augmentation: Ineffective Contraction Pattern     Labor complications: None     Additional complications:          Cervical ripening:                     Delivery:      Episiotomy: None     Indication for Episiotomy:       Perineal Lacerations: 1st Repaired:      Periurethral Laceration: bilateral Repaired: No   Labial Laceration:   Repaired:     Sulcus Laceration:   Repaired:     Vaginal Laceration:   Repaired:     Cervical Laceration:   Repaired:     Repair suture: None     Repair # of packets:       Last Value - EBL - Nursing (mL):       Sum - EBL - Nursing (mL): 0     Last Value - EBL - Anesthesia (mL): 95        Calculated QBL (mL):         Vaginal Sweep Performed: Yes     Surgicount Correct:         Other providers:       Anesthesia    Method: Epidural          Details (if applicable):  Trial of Labor      Categorization:      Priority:     Indications for :     Incision Type:       Additional  information:  Forceps:    Vacuum:    Breech:    Observed anomalies    Other (Comments):

## 2023-01-29 NOTE — ANESTHESIA PROCEDURE NOTES
Epidural    Patient location during procedure: OB   Reason for block: primary anesthetic   Reason for block: at surgeon's request, post-op pain management  Diagnosis: Active Labor   Start time: 1/29/2023 6:30 AM  Timeout: 1/29/2023 6:27 AM  End time: 1/29/2023 3:12 PM    Staffing  Performing Provider: Vinh Donaldson CRNA  Authorizing Provider: Vinh Donaldson CRNA        Preanesthetic Checklist  Completed: patient identified, IV checked, site marked, surgical consent, monitors and equipment checked, pre-op evaluation, timeout performed, anesthesia consent given, hand hygiene performed and patient being monitored  Preparation  Patient position: sitting  Prep: ChloraPrep  Reason for block: primary anesthetic   Epidural  Skin Anesthetic: lidocaine 1%  Administration type: single shot  Approach: midline  Interspace: L4-5    Block type: caudal.  Needle and Epidural Catheter  Needle type: Tuohy   Needle gauge: 18  Needle length: 5.0 inches  Catheter type: multi-orifice  Catheter size: 20 G  Insertion Attempts: 1  Test dose: 3 mL of lidocaine 1.5% with Epi 1-to-200,000  Additional Documentation: incremental injection, negative aspiration for heme and CSF and no paresthesia on injection  Needle localization: anatomical landmarks  Assessment  Ease of block: easy  Patient's tolerance of the procedure: comfortable throughout block No inadvertent dural puncture with Tuohy.  Dural puncture not performed with spinal needle

## 2023-01-30 VITALS
HEART RATE: 63 BPM | OXYGEN SATURATION: 100 % | RESPIRATION RATE: 18 BRPM | TEMPERATURE: 97 F | SYSTOLIC BLOOD PRESSURE: 100 MMHG | DIASTOLIC BLOOD PRESSURE: 67 MMHG

## 2023-01-30 PROBLEM — O99.340 DEPRESSION DURING PREGNANCY, ANTEPARTUM: Status: RESOLVED | Noted: 2022-11-21 | Resolved: 2023-01-30

## 2023-01-30 PROBLEM — O26.899 ABDOMINAL CRAMPING AFFECTING PREGNANCY: Status: RESOLVED | Noted: 2022-09-19 | Resolved: 2023-01-30

## 2023-01-30 PROBLEM — F32.A DEPRESSION DURING PREGNANCY, ANTEPARTUM: Status: RESOLVED | Noted: 2022-11-21 | Resolved: 2023-01-30

## 2023-01-30 PROBLEM — R10.9 ABDOMINAL CRAMPING AFFECTING PREGNANCY: Status: RESOLVED | Noted: 2022-09-19 | Resolved: 2023-01-30

## 2023-01-30 LAB
ANISOCYTOSIS BLD QL SMEAR: ABNORMAL
BASOPHILS # BLD AUTO: 0.04 X10(3)/MCL (ref 0.01–0.08)
BASOPHILS NFR BLD AUTO: 0.4 % (ref 0.1–1.2)
ELLIPTOCYTOSIS (OHS): ABNORMAL
EOSINOPHIL # BLD AUTO: 0.06 X10(3)/MCL (ref 0.04–0.36)
EOSINOPHIL NFR BLD AUTO: 0.6 % (ref 0.7–7)
ERYTHROCYTE [DISTWIDTH] IN BLOOD BY AUTOMATED COUNT: 16.7 % (ref 11–14.5)
HCT VFR BLD AUTO: 30.1 % (ref 36–48)
HGB BLD-MCNC: 9.2 GM/DL (ref 11.8–16)
IMM GRANULOCYTES # BLD AUTO: 0.02 X10(3)/MCL (ref 0–0.03)
IMM GRANULOCYTES NFR BLD AUTO: 0.2 % (ref 0–0.5)
LYMPHOCYTES # BLD AUTO: 2.14 X10(3)/MCL (ref 1.16–3.74)
LYMPHOCYTES NFR BLD AUTO: 22.2 % (ref 20–55)
MCH RBC QN AUTO: 22.1 PG (ref 27–34)
MCV RBC AUTO: 72.4 FL (ref 79–99)
MEAN CELL HEMOGLOBIN CONCENTRATION (OHS) G/DL: 30.6 G/DL (ref 31–37)
MONOCYTES # BLD AUTO: 0.9 X10(3)/MCL (ref 0.24–0.36)
MONOCYTES NFR BLD AUTO: 9.3 % (ref 4.7–12.5)
NEUTROPHILS # BLD AUTO: 6.49 X10(3)/MCL (ref 1.56–6.13)
NEUTROPHILS NFR BLD AUTO: 67.3 % (ref 37–73)
NRBC BLD AUTO-RTO: 0 % (ref 0–1)
PLATELET # BLD AUTO: 155 X10(3)/MCL (ref 140–371)
PLATELET # BLD EST: ADEQUATE 10*3/UL
PMV BLD AUTO: 11.5 FL (ref 9.4–12.4)
POIKILOCYTOSIS BLD QL SMEAR: ABNORMAL
RBC # BLD AUTO: 4.16 X10(6)/MCL (ref 4–5.1)
RBC MORPH BLD: ABNORMAL
WBC # SPEC AUTO: 9.7 X10(3)/MCL (ref 4–11.5)

## 2023-01-30 PROCEDURE — 36415 COLL VENOUS BLD VENIPUNCTURE: CPT | Performed by: OBSTETRICS & GYNECOLOGY

## 2023-01-30 PROCEDURE — 51702 INSERT TEMP BLADDER CATH: CPT

## 2023-01-30 PROCEDURE — 25000003 PHARM REV CODE 250: Performed by: OBSTETRICS & GYNECOLOGY

## 2023-01-30 PROCEDURE — 85025 COMPLETE CBC W/AUTO DIFF WBC: CPT | Performed by: OBSTETRICS & GYNECOLOGY

## 2023-01-30 RX ORDER — TRIPROLIDINE/PSEUDOEPHEDRINE 2.5MG-60MG
30 TABLET ORAL EVERY 6 HOURS PRN
Qty: 480 ML | Refills: 2 | Status: SHIPPED | OUTPATIENT
Start: 2023-01-30

## 2023-01-30 RX ADMIN — IBUPROFEN 600 MG: 600 TABLET, FILM COATED ORAL at 01:01

## 2023-01-30 RX ADMIN — HYDROCODONE BITARTRATE AND ACETAMINOPHEN 1 TABLET: 5; 325 TABLET ORAL at 02:01

## 2023-01-30 NOTE — ANESTHESIA POSTPROCEDURE EVALUATION
Anesthesia Post Evaluation    Patient: Meena Hurtado    Procedure(s) Performed: * No procedures listed *    Final Anesthesia Type: epidural      Patient location during evaluation: labor & delivery  Patient participation: Yes- Able to Participate  Level of consciousness: awake and alert and oriented  Post-procedure vital signs: reviewed and stable  Pain management: adequate  Airway patency: patent    PONV status at discharge: No PONV                Vitals Value Taken Time   /57 01/29/23 1708   Temp 36.7 °C (98 °F) 01/29/23 1737   Pulse 73 01/29/23 1708   Resp 18 01/29/23 1738   SpO2 100 % 01/29/23 0600         No case tracking events are documented in the log.      Pain/Yesy Score: Pain Rating Prior to Med Admin: 7 (1/29/2023  5:38 PM)

## 2023-01-30 NOTE — PLAN OF CARE
PT IS PROGRESSING WELL THROUGHOUT PLAN OF CARE FOR THE NIGHT. PT POSTPARTUM BLEEDING IS WNL; PT PAIN HAS BEEN LIMITED AND CONTROLLED. PLAN OF CARE DISCUSSED AND PT WILL CONTINUE WORKING TOWARD MEETING ALL GOALS.

## 2023-01-30 NOTE — DISCHARGE SUMMARY
[facility]  Obstetrics  Discharge Summary      Patient Name: Meena Hurtado  MRN: [id number]  Admission Date: 2023  Hospital Length of Stay: 1 days  Discharge Date and Time:  2023 4:57 PM  Attending Physician: Toñito Rocha MD   Discharging Provider: DULCE RILEY  Primary Care Provider: Reyes Coronado MD    HPI: Pt is doing well on rounds today and desires discharge. She is ambulating well and tolerating a regular diet. Positive flatulence, denies BM. She is currently bottle feeding. Denies any fever, chills, nausea, vomiting, chest pain, or shortness of breath. Lochia is mild. Cramping is controlled with Ibuprofen.    Hospital Course: Pt is a 23y/o AA Female  who presented to the hospital at 39w3d in active labor and was subsequently started on Pitocin d/t ineffective contraction pattern. Pt tolerated labor well and experienced a  per Dr. Rocha on 23 at 1501. She delivered a viable male male. Pt experienced a 1st degree bilateral periurethral lacerations that did not require repair. Both mother and baby tolerated delivery well and routine postpartum care was initiated. Postpartum coarse has been uneventful. Pt was GBS positive and did received antibiotics.    Consults (From admission, onward)          Status Ordering Provider     Inpatient consult to Anesthesiology  Once        Provider:  (Not yet assigned)    Acknowledged TOÑITO ROCHA            Final Active Diagnoses:    Diagnosis Date Noted POA    PRINCIPAL PROBLEM:  Vaginal delivery [O80] 2023 Not Applicable    Group B Streptococcus urinary tract infection affecting pregnancy in third trimester [O23.43, B95.1] 2023 Yes      Problems Resolved During this Admission:        Feeding Method: bottle    Immunizations       None            Delivery:    Episiotomy: None   Lacerations: 1st   Repair suture: None   Repair # of packets:     Blood loss (ml):       Birth information:  Date of birth:  1/29/2021   Time of birth: 3:01 PM   Sex: male   Delivery type: Vaginal, Spontaneous   Gestational Age: 39w3d    Delivery Clinician:      Other providers:       Additional  information:  Forceps:    Vacuum:    Breech:    Observed anomalies      Living?:           APGARS  One minute Five minutes Ten minutes   Skin color:         Heart rate:         Grimace:         Muscle tone:         Breathing:         Totals: 7  8  8      Placenta: Delivered:       appearance  Pending Diagnostic Studies:       None            Discharged Condition: good    Disposition: Home or Self Care    Follow Up:   Follow-up Information       [facility]. Schedule an appointment as soon as possible for a visit in 6 week(s).    Contact information:  [address] [facility] [id number] [phone]                           Patient Instructions:      Diet Adult Regular     Pelvic Rest   Order Comments: For 6 weeks     Notify your health care provider if you experience any of the following:  temperature >100.4     Notify your health care provider if you experience any of the following:  persistent nausea and vomiting or diarrhea     Notify your health care provider if you experience any of the following:  severe uncontrolled pain     Notify your health care provider if you experience any of the following:  difficulty breathing or increased cough     Notify your health care provider if you experience any of the following:  severe persistent headache     Notify your health care provider if you experience any of the following:  persistent dizziness, light-headedness, or visual disturbances     Notify your health care provider if you experience any of the following:  increased confusion or weakness     Activity as tolerated     Medications:  Current Discharge Medication List        START taking these medications    Details   ibuprofen (ADVIL,MOTRIN) 100 mg/5 mL suspension Take 30 mLs (600 mg total) by mouth every 6 (six) hours as needed for  Pain.  Qty: 480 mL, Refills: 2           CONTINUE these medications which have NOT CHANGED    Details   ELITE-OB 50 mg iron- 1.25 mg Tab Take 1 tablet by mouth once daily.      lansoprazole (PREVACID) 15 MG capsule Take 1 capsule (15 mg total) by mouth once daily.  Qty: 30 capsule, Refills: 1    Associated Diagnoses: Gastroesophageal reflux disease, unspecified whether esophagitis present      prenatal vit 10-iron fum-folic 65-1 mg Tab Take 1 tablet by mouth once daily.  Qty: 90 tablet, Refills: 0    Associated Diagnoses: 25 weeks gestation of pregnancy      sertraline (ZOLOFT) 25 MG tablet Take 1 tablet (25 mg total) by mouth once daily.  Qty: 30 tablet, Refills: 1    Associated Diagnoses: Depression during pregnancy, antepartum           STOP taking these medications       aspirin (ECOTRIN) 81 MG EC tablet Comments:   Reason for Stopping:         doxylamine-pyridoxine, vit B6, (BONJESTA) 20-20 mg TbID Comments:   Reason for Stopping:               DULCE RILEY  Obstetrics  [facility]

## 2023-01-30 NOTE — PLAN OF CARE
01/30/23 1602   OB Discharge Planning Assessment   Assessment Type Discharge Planning Assessment   Source of Information patient   Verified Demographic and Insurance Information Yes   Insurance Medicaid   Medicaid Healthy Blue   People in Home child(sarah), dependent;significant other   Name(s) of People in Home boyfrien   Number people in home 3   Relationship Status In relationship   Name of Support/Comfort Primary Source boyfriend - did not give name   Other children (include names and ages) one other child   Father's Involvement Fully Involved   Is Father signing the birth certificate Yes   Family Involvement High   Received Prenatal Care Yes   Transportation Anticipated car, drives self;family or friend will provide    Arrangements Self;Family   Adoption Planned no   Infant Feeding Plan breastfeeding   Previous Breastfeeding Experience yes   Breast Pump Needed no   Does baby have crib or safe sleep space? Yes   Has other essential care items? Clothing;Bottles;Diapers   Pediatrician    Resource/Environmental Concerns none   Equipment Currently Used at Home none   Potential Discharge Needs None   DME Needed Upon Discharge  none   DCFS No indications (Indicators for Report)   Discharge Plan A Home   Discharge Plan B Home

## 2023-01-30 NOTE — ANESTHESIA PROCEDURE NOTES
Epidural    Patient location during procedure: OB   Reason for block: primary anesthetic   Reason for block: at surgeon's request, post-op pain management  Diagnosis: Active Labor   End time: 1/29/2023 3:12 PM    Staffing  Performing Provider: Vinh Donaldson CRNA  Authorizing Provider: Vinh Donaldson CRNA        Preanesthetic Checklist  Completed: patient identified, IV checked, site marked, surgical consent, monitors and equipment checked, pre-op evaluation, timeout performed, anesthesia consent given, hand hygiene performed and patient being monitored  Preparation  Patient position: sitting  Prep: ChloraPrep  Reason for block: primary anesthetic   Epidural  Skin Anesthetic: lidocaine 1%  Administration type: single shot  Approach: midline  Interspace: L3-4    Block type: caudal.  Needle and Epidural Catheter  Needle type: Tuohy   Needle gauge: 18  Needle length: 5.0 inches  Catheter type: multi-orifice  Catheter size: 20 G  Insertion Attempts: 1  Test dose: 3 mL of lidocaine 1.5% with Epi 1-to-200,000  Additional Documentation: incremental injection, negative aspiration for heme and CSF and no paresthesia on injection  Needle localization: anatomical landmarks  Assessment  Ease of block: easy  Patient's tolerance of the procedure: comfortable throughout block No inadvertent dural puncture with Tuohy.  Dural puncture not performed with spinal needle

## 2023-01-30 NOTE — NURSING
PT ASKED TO GET UP TO RESTROOM; ASSISTED PT PER AMBULATION TO TOILET; PT VOIDING WITHOUT DIFFICULTY. ASSISTED PT TO POST ROOM 122. POSTPARTUM EDUCATION GIVEN TO PT; ALLOWED TIME FOR PT TO VOICE QUESTIONS AND CONCERNS. PT STATES UNDERSTANDING OF EDUCATION GIVEN. OBTAINED VS AND ASSESSMENT COMPLETED. WILL CONTINUE TO MONITOR.

## 2023-03-02 ENCOUNTER — ANESTHESIA (OUTPATIENT)
Dept: ANESTHESIOLOGY | Facility: HOSPITAL | Age: 23
End: 2023-03-02

## 2023-03-02 ENCOUNTER — ANESTHESIA EVENT (OUTPATIENT)
Dept: ANESTHESIOLOGY | Facility: HOSPITAL | Age: 23
End: 2023-03-02

## 2023-03-09 NOTE — ADDENDUM NOTE
Addendum  created 03/09/23 0946 by Vinh Donaldson CRNA    Intraprocedure Event deleted, Intraprocedure Event edited, Intraprocedure Staff edited

## 2023-05-01 PROBLEM — B95.1 GROUP B STREPTOCOCCUS URINARY TRACT INFECTION AFFECTING PREGNANCY IN THIRD TRIMESTER: Status: RESOLVED | Noted: 2023-01-09 | Resolved: 2023-05-01

## 2023-05-01 PROBLEM — O23.43 GROUP B STREPTOCOCCUS URINARY TRACT INFECTION AFFECTING PREGNANCY IN THIRD TRIMESTER: Status: RESOLVED | Noted: 2023-01-09 | Resolved: 2023-05-01

## 2025-05-30 ENCOUNTER — LAB VISIT (OUTPATIENT)
Dept: LAB | Facility: HOSPITAL | Age: 25
End: 2025-05-30
Attending: FAMILY MEDICINE
Payer: MEDICAID

## 2025-05-30 DIAGNOSIS — E11.9 DIABETES MELLITUS WITHOUT COMPLICATION: Primary | ICD-10-CM

## 2025-05-30 DIAGNOSIS — E78.00 PURE HYPERCHOLESTEROLEMIA: ICD-10-CM

## 2025-05-30 LAB
25(OH)D3+25(OH)D2 SERPL-MCNC: 20 NG/ML (ref 30–80)
ALBUMIN SERPL-MCNC: 3.7 G/DL (ref 3.5–5)
ALBUMIN/GLOB SERPL: 1.1 RATIO (ref 1.1–2)
ALP SERPL-CCNC: 95 UNIT/L (ref 40–150)
ALT SERPL-CCNC: 9 UNIT/L (ref 0–55)
ANION GAP SERPL CALC-SCNC: 5 MEQ/L
AST SERPL-CCNC: 24 UNIT/L (ref 11–45)
BASOPHILS # BLD AUTO: 0.07 X10(3)/MCL
BASOPHILS NFR BLD AUTO: 1.4 %
BILIRUB SERPL-MCNC: 0.5 MG/DL
BUN SERPL-MCNC: 10 MG/DL (ref 7–18.7)
CALCIUM SERPL-MCNC: 8.9 MG/DL (ref 8.4–10.2)
CHLORIDE SERPL-SCNC: 110 MMOL/L (ref 98–107)
CHOLEST SERPL-MCNC: 150 MG/DL
CHOLEST/HDLC SERPL: 2 {RATIO} (ref 0–5)
CO2 SERPL-SCNC: 26 MMOL/L (ref 22–29)
CREAT SERPL-MCNC: 0.75 MG/DL (ref 0.55–1.02)
CREAT/UREA NIT SERPL: 13
EOSINOPHIL # BLD AUTO: 0.21 X10(3)/MCL (ref 0–0.9)
EOSINOPHIL NFR BLD AUTO: 4.1 %
ERYTHROCYTE [DISTWIDTH] IN BLOOD BY AUTOMATED COUNT: 13.7 % (ref 11.5–17)
EST. AVERAGE GLUCOSE BLD GHB EST-MCNC: 91.1 MG/DL
GFR SERPLBLD CREATININE-BSD FMLA CKD-EPI: >60 ML/MIN/1.73/M2
GLOBULIN SER-MCNC: 3.5 GM/DL (ref 2.4–3.5)
GLUCOSE SERPL-MCNC: 85 MG/DL (ref 74–100)
HBA1C MFR BLD: 4.8 %
HCT VFR BLD AUTO: 42.6 % (ref 37–47)
HDLC SERPL-MCNC: 62 MG/DL (ref 35–60)
HGB BLD-MCNC: 13.2 G/DL (ref 12–16)
IMM GRANULOCYTES # BLD AUTO: 0.01 X10(3)/MCL (ref 0–0.04)
IMM GRANULOCYTES NFR BLD AUTO: 0.2 %
LDLC SERPL CALC-MCNC: 82 MG/DL (ref 50–140)
LYMPHOCYTES # BLD AUTO: 2.1 X10(3)/MCL (ref 0.6–4.6)
LYMPHOCYTES NFR BLD AUTO: 41.3 %
MCH RBC QN AUTO: 25.7 PG (ref 27–31)
MCHC RBC AUTO-ENTMCNC: 31 G/DL (ref 33–36)
MCV RBC AUTO: 83 FL (ref 80–94)
MONOCYTES # BLD AUTO: 0.54 X10(3)/MCL (ref 0.1–1.3)
MONOCYTES NFR BLD AUTO: 10.6 %
NEUTROPHILS # BLD AUTO: 2.16 X10(3)/MCL (ref 2.1–9.2)
NEUTROPHILS NFR BLD AUTO: 42.4 %
NRBC BLD AUTO-RTO: 0 %
PLATELET # BLD AUTO: 358 X10(3)/MCL (ref 130–400)
PMV BLD AUTO: 9.5 FL (ref 7.4–10.4)
POTASSIUM SERPL-SCNC: 4.2 MMOL/L (ref 3.5–5.1)
PROT SERPL-MCNC: 7.2 GM/DL (ref 6.4–8.3)
RBC # BLD AUTO: 5.13 X10(6)/MCL (ref 4.2–5.4)
SODIUM SERPL-SCNC: 141 MMOL/L (ref 136–145)
T4 FREE SERPL-MCNC: 1.09 NG/DL (ref 0.7–1.48)
TRIGL SERPL-MCNC: 32 MG/DL (ref 37–140)
TSH SERPL-ACNC: 0.15 UIU/ML (ref 0.35–4.94)
VLDLC SERPL CALC-MCNC: 6 MG/DL
WBC # BLD AUTO: 5.09 X10(3)/MCL (ref 4.5–11.5)

## 2025-05-30 PROCEDURE — 85025 COMPLETE CBC W/AUTO DIFF WBC: CPT

## 2025-05-30 PROCEDURE — 80053 COMPREHEN METABOLIC PANEL: CPT

## 2025-05-30 PROCEDURE — 82306 VITAMIN D 25 HYDROXY: CPT

## 2025-05-30 PROCEDURE — 36415 COLL VENOUS BLD VENIPUNCTURE: CPT

## 2025-05-30 PROCEDURE — 83036 HEMOGLOBIN GLYCOSYLATED A1C: CPT

## 2025-05-30 PROCEDURE — 84439 ASSAY OF FREE THYROXINE: CPT

## 2025-05-30 PROCEDURE — 80061 LIPID PANEL: CPT

## 2025-05-30 PROCEDURE — 84443 ASSAY THYROID STIM HORMONE: CPT
